# Patient Record
Sex: FEMALE | Race: WHITE | Employment: UNEMPLOYED | URBAN - METROPOLITAN AREA
[De-identification: names, ages, dates, MRNs, and addresses within clinical notes are randomized per-mention and may not be internally consistent; named-entity substitution may affect disease eponyms.]

---

## 2017-05-08 ENCOUNTER — ED HISTORICAL/CONVERTED ENCOUNTER (OUTPATIENT)
Dept: OTHER | Age: 22
End: 2017-05-08

## 2017-05-16 ENCOUNTER — OFFICE VISIT (OUTPATIENT)
Dept: FAMILY MEDICINE CLINIC | Age: 22
End: 2017-05-16

## 2017-05-16 VITALS
BODY MASS INDEX: 24.95 KG/M2 | TEMPERATURE: 98.4 F | HEART RATE: 84 BPM | RESPIRATION RATE: 18 BRPM | SYSTOLIC BLOOD PRESSURE: 120 MMHG | HEIGHT: 62 IN | OXYGEN SATURATION: 100 % | WEIGHT: 135.6 LBS | DIASTOLIC BLOOD PRESSURE: 62 MMHG

## 2017-05-16 DIAGNOSIS — F51.4 NIGHT TERRORS, ADULT: ICD-10-CM

## 2017-05-16 DIAGNOSIS — J02.9 SORE THROAT: Primary | ICD-10-CM

## 2017-05-16 DIAGNOSIS — F51.01 PRIMARY INSOMNIA: ICD-10-CM

## 2017-05-16 LAB
S PYO AG THROAT QL: NEGATIVE
VALID INTERNAL CONTROL?: YES

## 2017-05-16 RX ORDER — CLONAZEPAM 0.5 MG/1
0.5 TABLET ORAL
Qty: 30 TAB | Refills: 0 | Status: SHIPPED | OUTPATIENT
Start: 2017-05-16 | End: 2017-10-28

## 2017-05-16 NOTE — PROGRESS NOTES
Milly Medrano is a 24 y.o. female   Chief Complaint   Patient presents with    Sore Throat    pt with a sore throat for past 3 days. Had strep throat 6 months ago and feels like that again. Pt does have seasonal allergies as well. Feels hot and cold but has not taken her temp. Took aleve an hour ago and did help. Pt also has not been sleeping well for the past month. States can fall asleep then tosses and turns all night. Pt also reports gets night terrors wakes up \"screaming blood murder. \"   checked. she is a 24y.o. year old female who presents for evalution. Reviewed PmHx, RxHx, FmHx, SocHx, AllgHx and updated and dated in the chart. Review of Systems - negative except as listed above in the HPI    Objective:     Vitals:    05/16/17 1421   BP: 120/62   Pulse: 84   Resp: 18   Temp: 98.4 °F (36.9 °C)   TempSrc: Oral   SpO2: 100%   Weight: 135 lb 9.6 oz (61.5 kg)   Height: 5' 2\" (1.575 m)       Current Outpatient Prescriptions   Medication Sig    clonazePAM (KLONOPIN) 0.5 mg tablet Take 1 Tab by mouth nightly as needed. Max Daily Amount: 0.5 mg.    NUVARING 0.12-0.015 mg/24 hr vaginal ring INSERT 1 RING VAGINALLY AS DIRECTED. REMOVE AFTER 3 WEEKS & WAIT 7 DAYS BEFORE INSERTING A NEW RING    methocarbamol (ROBAXIN) 750 mg tablet Take 1 Tab by mouth two (2) times daily as needed.  diclofenac EC (VOLTAREN) 75 mg EC tablet Take 1 Tab by mouth two (2) times a day.  buPROPion XL (WELLBUTRIN XL) 300 mg XL tablet Take 1 Tab by mouth every morning. Maintenance dose. No current facility-administered medications for this visit.         Physical Examination: General appearance - alert, well appearing, and in no distress  Mental status - alert, oriented to person, place, and time  Eyes - pupils equal and reactive, extraocular eye movements intact  Ears - bilateral TM's and external ear canals normal  Mouth - mucous membranes moist, mild cobblestoning c/w PND  Neck - supple, no significant adenopathy  Chest - clear to auscultation, no wheezes, rales or rhonchi, symmetric air entry  Heart - normal rate, regular rhythm, normal S1, S2, no murmurs, rubs, clicks or gallops      Assessment/ Plan:   Garry Montoya was seen today for sore throat. Diagnoses and all orders for this visit:    Sore throat  -     AMB POC RAPID STREP A  Supportive care discussed with pt   Night terrors, adult  -     clonazePAM (KLONOPIN) 0.5 mg tablet; Take 1 Tab by mouth nightly as needed. Max Daily Amount: 0.5 mg.    Primary insomnia  -     clonazePAM (KLONOPIN) 0.5 mg tablet; Take 1 Tab by mouth nightly as needed. Max Daily Amount: 0.5 mg. Follow-up Disposition:  Return in about 1 month (around 6/16/2017), or if symptoms worsen or fail to improve. I have discussed the diagnosis with the patient and the intended plan as seen in the above orders. The patient has received an after-visit summary and questions were answered concerning future plans. Pt conveyed understanding of plan.     Medication Side Effects and Warnings were discussed with patient      Ioana Moseley,

## 2017-05-16 NOTE — MR AVS SNAPSHOT
Visit Information Date & Time Provider Department Dept. Phone Encounter #  
 5/16/2017  2:15 PM Hal CisseAleta [phone] [id number] Follow-up Instructions Return in about 1 month (around 6/16/2017), or if symptoms worsen or fail to improve. Upcoming Health Maintenance Date Due  
 HPV AGE 9Y-34Y (1 of 3 - Female 3 Dose Series) 12/11/2006 DTaP/Tdap/Td series (1 - Tdap) 12/11/2016 PAP AKA CERVICAL CYTOLOGY 12/11/2016 INFLUENZA AGE 9 TO ADULT 8/1/2017 Allergies as of 5/16/2017  Review Complete On: 5/16/2017 By: Hal Cisse, DO Severity Noted Reaction Type Reactions Lamictal [Lamotrigine]  09/06/2016    Rash Current Immunizations  Never Reviewed No immunizations on file. Not reviewed this visit You Were Diagnosed With   
  
 Codes Comments Sore throat    -  Primary ICD-10-CM: J02.9 ICD-9-CM: 467 Night terrors, adult     ICD-10-CM: F51.4 ICD-9-CM: 307.46 Primary insomnia     ICD-10-CM: F51.01 
ICD-9-CM: 307.42 Vitals BP Pulse Temp Resp Height(growth percentile) Weight(growth percentile) 120/62 84 98.4 °F (36.9 °C) (Oral) 18 5' 2\" (1.575 m) 135 lb 9.6 oz (61.5 kg) SpO2 BMI OB Status Smoking Status 100% 24.8 kg/m2 Chemically Induced Former Smoker Vitals History BMI and BSA Data Body Mass Index Body Surface Area  
 24.8 kg/m 2 1.64 m 2 Preferred Pharmacy Pharmacy Name Phone [id number]- [place], [place] - [address] [place] [address] AT [address] [place] [address] [phone] Your Updated Medication List  
  
   
This list is accurate as of: 5/16/17  2:46 PM.  Always use your most recent med list.  
  
  
  
  
 buPROPion  mg XL tablet Commonly known as:  Alma Delia Tucker Take 1 Tab by mouth every morning. Maintenance dose. clonazePAM 0.5 mg tablet Commonly known as:  Simona Jose Take 1 Tab by mouth nightly as needed.  Max Daily Amount: 0.5 mg.  
 diclofenac EC 75 mg EC tablet Commonly known as:  VOLTAREN Take 1 Tab by mouth two (2) times a day. methocarbamol 750 mg tablet Commonly known as:  ROBAXIN Take 1 Tab by mouth two (2) times daily as needed. NUVARING 0.12-0.015 mg/24 hr vaginal ring Generic drug:  ethinyl estradiol-etonogestrel INSERT 1 RING VAGINALLY AS DIRECTED. REMOVE AFTER 3 WEEKS & WAIT 7 DAYS BEFORE INSERTING A NEW RING Prescriptions Printed Refills  
 clonazePAM (KLONOPIN) 0.5 mg tablet 0 Sig: Take 1 Tab by mouth nightly as needed. Max Daily Amount: 0.5 mg.  
 Class: Print Route: Oral  
  
We Performed the Following AMB POC RAPID STREP A [12727 CPT(R)] Follow-up Instructions Return in about 1 month (around 6/16/2017), or if symptoms worsen or fail to improve. To-Do List   
 05/17/2017 10:30 AM  
  Appointment with [facility] MRI 1 at [facility] ([phone]) 1. Please bring a list or a bag of your current medications to your appointment 2. Please be sure to remove ALL hair clips, pins, extensions, etc., prior to arriving for your MRI procedure. 3. Bring any non [facility] films or CDs pertaining to the area being imaged with you on the day of appointment. 4. A written order with a valid diagnosis and Physicians  signature is required for all scheduled tests. 5. Check in at registration 30min before your appointment time unless you were instructed to do otherwise. Patient Instructions Insomnia: Care Instructions Your Care Instructions Insomnia is the inability to sleep well. It is a common problem for most people at some time. Insomnia may make it hard for you to get to sleep, stay asleep, or sleep as long as you need to. This can make you tired and grouchy during the day. It can also make you forgetful, less effective at work, and unhappy. Insomnia can be caused by conditions such as depression or anxiety.  Pain can also affect your ability to sleep. When these problems are solved, the insomnia usually clears up. But sometimes bad sleep habits can cause insomnia. If insomnia is affecting your work or your enjoyment of life, you can take steps to improve your sleep. Follow-up care is a key part of your treatment and safety. Be sure to make and go to all appointments, and call your doctor if you are having problems. It's also a good idea to know your test results and keep a list of the medicines you take. How can you care for yourself at home? What to avoid · Do not have drinks with caffeine, such as coffee or black tea, for 8 hours before bed. · Do not smoke or use other types of tobacco near bedtime. Nicotine is a stimulant and can keep you awake. · Avoid drinking alcohol late in the evening, because it can cause you to wake in the middle of the night. · Do not eat a big meal close to bedtime. If you are hungry, eat a light snack. · Do not drink a lot of water close to bedtime, because the need to urinate may wake you up during the night. · Do not read or watch TV in bed. Use the bed only for sleeping and sexual activity. What to try · Go to bed at the same time every night, and wake up at the same time every morning. Do not take naps during the day. · Keep your bedroom quiet, dark, and cool. · Sleep on a comfortable pillow and mattress. · If watching the clock makes you anxious, turn it facing away from you so you cannot see the time. · If you worry when you lie down, start a worry book. Well before bedtime, write down your worries, and then set the book and your concerns aside. · Try meditation or other relaxation techniques before you go to bed. · If you cannot fall asleep, get up and go to another room until you feel sleepy. Do something relaxing. Repeat your bedtime routine before you go to bed again. · Make your house quiet and calm about an hour before bedtime.  Turn down the lights, turn off the TV, log off the computer, and turn down the volume on music. This can help you relax after a busy day. When should you call for help? Watch closely for changes in your health, and be sure to contact your doctor if: 
· Your efforts to improve your sleep do not work. · Your insomnia gets worse. · You have been feeling down, depressed, or hopeless or have lost interest in things that you usually enjoy. Where can you learn more? Go to http://mirella-naga.info/. Enter P513 in the search box to learn more about \"Insomnia: Care Instructions. \" Current as of: July 26, 2016 Content Version: 11.2 © 9298-7113 [employer]. Care instructions adapted under license by [employer] (which disclaims liability or warranty for this information). If you have questions about a medical condition or this instruction, always ask your healthcare professional. [place] [address] any warranty or liability for your use of this information. Introducing [facility]! Mile Burt introduces [employer] patient portal. Now you can access parts of your medical record, email your doctor's office, and request medication refills online. 1. In your internet browser, go to [email or web address] 2. Click on the First Time User? Click Here link in the Sign In box. You will see the New Member Sign Up page. 3. Enter your [employer] Access Code exactly as it appears below. You will not need to use this code after youve completed the sign-up process. If you do not sign up before the expiration date, you must request a new code. · [employer] Access Code: [email or web address] Expires: 8/14/2017  2:45 PM 
 
4. Enter the last four digits of your Social Security Number (xxxx) and Date of Birth (mm/dd/yyyy) as indicated and click Submit. You will be taken to the next sign-up page. 5. Create a Experts 911 ID. This will be your Experts 911 login ID and cannot be changed, so think of one that is secure and easy to remember. 6. Create a Experts 911 password. You can change your password at any time. 7. Enter your Password Reset Question and Answer. This can be used at a later time if you forget your password. 8. Enter your e-mail address. You will receive e-mail notification when new information is available in [address] 9. Click Sign Up. You can now view and download portions of your medical record. 10. Click the Download Summary menu link to download a portable copy of your medical information. If you have questions, please visit the Frequently Asked Questions section of the Experts 911 website. Remember, [employer] 911 is NOT to be used for urgent needs. For medical emergencies, dial 911. Now available from your iPhone and Android! Please provide this summary of care documentation to your next provider. Your primary care clinician is listed as Mildred Benítez. If you have any questions after today's visit, please call [phone].

## 2017-05-16 NOTE — PATIENT INSTRUCTIONS

## 2017-10-28 ENCOUNTER — HOSPITAL ENCOUNTER (EMERGENCY)
Age: 22
Discharge: HOME | End: 2017-10-28
Attending: EMERGENCY MEDICINE | Admitting: EMERGENCY MEDICINE
Payer: SELF-PAY

## 2017-10-28 ENCOUNTER — APPOINTMENT (OUTPATIENT)
Dept: GENERAL RADIOLOGY | Age: 22
End: 2017-10-28
Attending: EMERGENCY MEDICINE
Payer: SELF-PAY

## 2017-10-28 VITALS
TEMPERATURE: 98.1 F | OXYGEN SATURATION: 97 % | HEIGHT: 62 IN | BODY MASS INDEX: 26.57 KG/M2 | DIASTOLIC BLOOD PRESSURE: 80 MMHG | SYSTOLIC BLOOD PRESSURE: 128 MMHG | WEIGHT: 144.4 LBS | HEART RATE: 90 BPM | RESPIRATION RATE: 16 BRPM

## 2017-10-28 DIAGNOSIS — J06.9 ACUTE UPPER RESPIRATORY INFECTION: Primary | ICD-10-CM

## 2017-10-28 LAB — HCG UR QL: NEGATIVE

## 2017-10-28 PROCEDURE — 99283 EMERGENCY DEPT VISIT LOW MDM: CPT

## 2017-10-28 PROCEDURE — 81025 URINE PREGNANCY TEST: CPT

## 2017-10-28 PROCEDURE — 71020 XR CHEST PA LAT: CPT

## 2017-10-28 RX ORDER — PROMETHAZINE HYDROCHLORIDE AND CODEINE PHOSPHATE 6.25; 1 MG/5ML; MG/5ML
5 SOLUTION ORAL
Qty: 120 ML | Refills: 0 | Status: SHIPPED | OUTPATIENT
Start: 2017-10-28 | End: 2019-08-26

## 2017-10-28 RX ORDER — PREDNISONE 20 MG/1
20 TABLET ORAL DAILY
Qty: 5 TAB | Refills: 0 | Status: SHIPPED | OUTPATIENT
Start: 2017-10-28 | End: 2017-11-02

## 2017-10-28 NOTE — ED TRIAGE NOTES
Pt ambulates to treatment area she states that 4 days ago she began with a cough that has gotten much worse and this morning she coughed so much she vomited after the cough. She notes that she has a dull ache in her chest since the cough.  Pt has some sinus pressure as well she has not taken anything for her symptoms

## 2017-10-28 NOTE — ED PROVIDER NOTES
HPI Comments: The patient has a 4 day history of cough that is occasionally productive. She also has had nasal congestion. She is unsure if she has had fever, but the cough became worse this morning and caused her to have one episode of vomiting. She also has a \"dull ache\" in the center of her chest which is pleuritic. She denies having shortness of breath, wheezing or other complaints. Patient is a 24 y.o. female presenting with cough. Cough   Associated symptoms include nausea and vomiting. Pertinent negatives include no chest pain, no headaches, no shortness of breath, no wheezing and no confusion. Past Medical History:   Diagnosis Date    Anxiety     Asthma     Bipolar 1 disorder ([address]     Pneumonia     Strep throat        Past Surgical History:   Procedure Laterality Date    HX HEENT      strabismus correction          History reviewed. No pertinent family history. Social History     Social History    Marital status:      Spouse name: N/A    Number of children: N/A    Years of education: N/A     Occupational History    Not on file. Social History Main Topics    Smoking status: Former Smoker     Packs/day: 0.50    Smokeless tobacco: Never Used    Alcohol use Yes      Comment: occasional    Drug use: Yes     Special: Other    Sexual activity: Yes     Partners: Male     Other Topics Concern    Not on file     Social History Narrative         ALLERGIES: Lamictal [lamotrigine]    Review of Systems   Constitutional: Negative for fever. HENT: Positive for congestion. Eyes: Negative for visual disturbance. Respiratory: Positive for cough and chest tightness. Negative for shortness of breath and wheezing. Cardiovascular: Negative for chest pain and leg swelling. Gastrointestinal: Positive for nausea and vomiting. Negative for abdominal pain and diarrhea. Genitourinary: Positive for dysuria. Musculoskeletal: Negative. Negative for back pain and neck stiffness. Skin: Negative for rash. Neurological: Negative. Negative for syncope and headaches. Psychiatric/Behavioral: Negative for confusion. Vitals:    10/28/17 1015   BP: 128/80   Pulse: 90   Resp: 16   Temp: 98.1 °F (36.7 °C)   SpO2: 97%   Weight: 65.5 kg (144 lb 6.4 oz)   Height: 5' 2\" (1.575 m)            Physical Exam   Constitutional: She appears well-developed and well-nourished. No distress. HENT:   Head: Normocephalic. Mouth/Throat: Oropharynx is clear and moist. No oropharyngeal exudate. Eyes: Pupils are equal, round, and reactive to light. Neck: Normal range of motion. Cardiovascular: Normal rate and regular rhythm. No murmur heard. Pulmonary/Chest: Effort normal and breath sounds normal. No respiratory distress. She has no wheezes. Abdominal: Soft. There is no tenderness. Musculoskeletal: Normal range of motion. She exhibits no edema. Neurological: She is alert. She has normal strength. Skin: Skin is warm and dry. Psychiatric: She has a normal mood and affect. Her behavior is normal.   Nursing note and vitals reviewed.        [facility]  ED Course       Procedures

## 2017-10-28 NOTE — DISCHARGE INSTRUCTIONS
Upper Respiratory Infection (Cold): Care Instructions  Your Care Instructions    An upper respiratory infection, or URI, is an infection of the nose, sinuses, or throat. URIs are spread by coughs, sneezes, and direct contact. The common cold is the most frequent kind of URI. The flu and sinus infections are other kinds of URIs. Almost all URIs are caused by viruses. Antibiotics won't cure them. But you can treat most infections with home care. This may include drinking lots of fluids and taking over-the-counter pain medicine. You will probably feel better in 4 to 10 days. The doctor has checked you carefully, but problems can develop later. If you notice any problems or new symptoms, get medical treatment right away. Follow-up care is a key part of your treatment and safety. Be sure to make and go to all appointments, and call your doctor if you are having problems. It's also a good idea to know your test results and keep a list of the medicines you take. How can you care for yourself at home? · To prevent dehydration, drink plenty of fluids, enough so that your urine is light yellow or clear like water. Choose water and other caffeine-free clear liquids until you feel better. If you have kidney, heart, or liver disease and have to limit fluids, talk with your doctor before you increase the amount of fluids you drink. · Take an over-the-counter pain medicine, such as acetaminophen (Tylenol), ibuprofen (Advil, Motrin), or naproxen (Aleve). Read and follow all instructions on the label. · Before you use cough and cold medicines, check the label. These medicines may not be safe for young children or for people with certain health problems. · Be careful when taking over-the-counter cold or flu medicines and Tylenol at the same time. Many of these medicines have acetaminophen, which is Tylenol. Read the labels to make sure that you are not taking more than the recommended dose.  Too much acetaminophen (Tylenol) can be harmful. · Get plenty of rest.  · Do not smoke or allow others to smoke around you. If you need help quitting, talk to your doctor about stop-smoking programs and medicines. These can increase your chances of quitting for good. When should you call for help? Call 911 anytime you think you may need emergency care. For example, call if:  ? · You have severe trouble breathing. ?Call your doctor now or seek immediate medical care if:  ? · You seem to be getting much sicker. ? · You have new or worse trouble breathing. ? · You have a new or higher fever. ? · You have a new rash. ? Watch closely for changes in your health, and be sure to contact your doctor if:  ? · You have a new symptom, such as a sore throat, an earache, or sinus pain. ? · You cough more deeply or more often, especially if you notice more mucus or a change in the color of your mucus. ? · You do not get better as expected. Where can you learn more? Go to http://mirella-naga.info/. Enter H862 in the search box to learn more about \"Upper Respiratory Infection (Cold): Care Instructions. \"  Current as of: May 12, 2017  Content Version: 11.4  © 3182-6128 [employer]. Care instructions adapted under license by [employer] (which disclaims liability or warranty for this information). If you have questions about a medical condition or this instruction, always ask your healthcare professional. [place] [address] any warranty or liability for your use of this information.

## 2017-10-28 NOTE — ED NOTES
Pt given discharge instructions by Dr Wilda Lipcsomb she verbalizes an understanding pt stable at time of discharge ambulates to funmi

## 2019-08-26 ENCOUNTER — OFFICE VISIT (OUTPATIENT)
Dept: FAMILY MEDICINE CLINIC | Age: 24
End: 2019-08-26

## 2019-08-26 VITALS
BODY MASS INDEX: 26.87 KG/M2 | RESPIRATION RATE: 18 BRPM | WEIGHT: 146 LBS | TEMPERATURE: 98.6 F | HEIGHT: 62 IN | OXYGEN SATURATION: 98 % | HEART RATE: 103 BPM | DIASTOLIC BLOOD PRESSURE: 79 MMHG | SYSTOLIC BLOOD PRESSURE: 125 MMHG

## 2019-08-26 DIAGNOSIS — F41.0 ANXIETY ATTACK: Primary | ICD-10-CM

## 2019-08-26 DIAGNOSIS — F51.01 PRIMARY INSOMNIA: ICD-10-CM

## 2019-08-26 DIAGNOSIS — F41.1 GAD (GENERALIZED ANXIETY DISORDER): ICD-10-CM

## 2019-08-26 RX ORDER — VENLAFAXINE HYDROCHLORIDE 75 MG/1
75 CAPSULE, EXTENDED RELEASE ORAL DAILY
Qty: 30 CAP | Refills: 1 | Status: SHIPPED | OUTPATIENT
Start: 2019-08-26 | End: 2020-05-19 | Stop reason: SINTOL

## 2019-08-26 RX ORDER — CLONAZEPAM 1 MG/1
1 TABLET ORAL
Qty: 45 TAB | Refills: 2 | Status: SHIPPED | OUTPATIENT
Start: 2019-08-26 | End: 2020-05-19 | Stop reason: SDUPTHER

## 2019-08-26 RX ORDER — VENLAFAXINE HYDROCHLORIDE 37.5 MG/1
37.5 CAPSULE, EXTENDED RELEASE ORAL DAILY
Qty: 7 CAP | Refills: 0 | Status: SHIPPED | OUTPATIENT
Start: 2019-08-26 | End: 2020-05-19 | Stop reason: SINTOL

## 2019-08-26 NOTE — PROGRESS NOTES
Chief Complaint   Patient presents with    Anxiety    Medication Evaluation     Patient in office today for anxiety that has worsened in the past 3 months,  Pt states she has recently had break up in life  Pt states anxiety attacks is affecting sleep and work, have not slept in 2 days. Pt notes 3 panic attacks a wk, that can last up to 5-20mins. Pt states in the past have tried and failed vistaril. Having severe anxiety to the point that she is having to miss work. Pt has previously been diagnosed with bipolar disorder. Has previously been manic but more often has the severe depression. Doesn't feel manic right now. Just feels like she cannot turn off her brain and severe anxiety sx are what is keeping her up. Was previously on wellbutrin to help with smoking cessation. Stopped the medication when she stopped smoking and things seemed to feel better. Seeing a therapist.   Long wait for an appt with psych. Has an appt at [facility] but needs something to tier her. Has tried and failed buspar. Denies any other concerns at this time. Chief Complaint   Patient presents with    Anxiety    Medication Evaluation     she is a 21y.o. year old female who presents for evalution. Reviewed PmHx, RxHx, FmHx, SocHx, AllgHx and updated and dated in the chart. Review of Systems - negative except as listed above in the HPI    Objective:     Vitals:    08/26/19 1040   BP: 125/79   Pulse: (!) 103   Resp: 18   Temp: 98.6 °F (37 °C)   TempSrc: Oral   SpO2: 98%   Weight: 146 lb (66.2 kg)   Height: 5' 2\" (1.575 m)     Physical Examination: General appearance - alert, well appearing, and in no distress  Mental status - depressed mood, anxious  Chest - clear to auscultation, no wheezes, rales or rhonchi, symmetric air entry  Heart - normal rate, regular rhythm, normal S1, S2, no murmurs    Assessment/ Plan:   Diagnoses and all orders for this visit:    1.  Anxiety attack  -     clonazePAM (KLONOPIN) 1 mg tablet; Take 1 Tab by mouth two (2) times daily as needed (anxiety). Max Daily Amount: 2 mg. 45 tabs should last 30 days. Start klonopin BID PRN for acute anxiety sx. Reinforced SEs/ADRs, only taking as prescribed, do not mix with etoh, and taking for acute sx of anxiety only. 2. ASHANTI (generalized anxiety disorder)  -     venlafaxine-SR (EFFEXOR-XR) 37.5 mg capsule; Take 1 Cap by mouth daily. Starting dose days  1-7.  -     venlafaxine-SR (EFFEXOR-XR) 75 mg capsule; Take 1 Cap by mouth daily. Maintenance dose. Start effexor daily. Reviewed SEs? ADRs of medication. Enc pt to follow up if sx persist or worsen or if medication SEs intolerable to discuss alt medication options. Keep appt with psych. Continue working with therapist. We discussed that sometimes SNRIs can exacerbate bipolar sx and enc to stop medication should that happen. 3. Primary insomnia  -     clonazePAM (KLONOPIN) 1 mg tablet; Take 1 Tab by mouth two (2) times daily as needed (anxiety). Max Daily Amount: 2 mg. 45 tabs should last 30 days. Klonopin before bedtime as needed. Follow-up and Dispositions    · Return if symptoms worsen or fail to improve. I have discussed the diagnosis with the patient and the intended plan as seen in the above orders. The patient has received an after-visit summary and questions were answered concerning future plans.      Medication Side Effects and Warnings were discussed with patient: yes  Patient Labs were reviewed and or requested: no  Patient Past Records were reviewed and or requested  yes  Patient / Caregiver Understanding of treatment plan was verbalized during office visit MESHA Perez    Patient Instructions   [facility] 12 [address] [facility] [address] [place] [address], [facility]  Phone [phone]    For therapy  Olivia Martinez LCSW  [facility]  [address] [place] [address]   Jeaneth Ramsay 57  [phone]     [facility] [address] [place] [address], [address] [facility] [address]  [phone]    [address] [place] [address]. [address] [place], [place] [address]Jazzmine La, [address]  Phone: [phone]  Fax: [phone]    4010 [facility] [address]  Phone: [phone]  Fax: [phone] [address] [facility], [address]  Phone: [phone]  Fax: [phone]      Venlafaxine (By mouth)   Venlafaxine (ysh-te-LIG-een)  Treats depression, generalized anxiety disorder, panic disorder, and social anxiety disorder. Brand Name(s): Effexor XR   There may be other brand names for this medicine. When This Medicine Should Not Be Used: This medicine is not right for everyone. Do not use it if you had an allergic reaction to venlafaxine or desvenlafaxine succinate. How to Use This Medicine:   Long Acting Capsule, Tablet, Long Acting Tablet  · Take your medicine as directed. Your dose may need to be changed several times to find what works best for you. · It is best to take the extended-release capsule at the same time each day (either in the morning or evening). · It is best to take this medicine with food or milk. · Swallow the extended-release capsule whole. Do not crush, break, or chew it. Do not place the capsule in a liquid. · If you cannot swallow the extended-release capsule, you may open it and pour the medicine into a small amount of soft food such as pudding, yogurt, or applesauce. Stir this mixture well and swallow it without chewing. · This medicine should come with a Medication Guide. Ask your pharmacist for a copy if you do not have one. · Missed dose: Take a dose as soon as you remember. If it is almost time for your next dose, wait until then and take a regular dose. Do not take extra medicine to make up for a missed dose.   · Store the medicine in a closed container at room temperature, away from heat, moisture, and direct light. Drugs and Foods to Avoid:   Ask your doctor or pharmacist before using any other medicine, including over-the-counter medicines, vitamins, and herbal products. · Do not use this medicine if you have used an MAO inhibitor within the past 14 days. Do not take an MAO inhibitor for at least 7 days after you stop this medicine. · Some medicines can affect how venlafaxine works. Tell your doctor if you are using any of the following:   ¨ Buspirone, cimetidine, fentanyl, ketoconazole, lithium, metoprolol, mirtazapine, Radhika's wort, tramadol, or tryptophan supplements  ¨ Amphetamines  ¨ Blood thinner (including warfarin)  ¨ Diuretic (water pill)  ¨ Medicine for migraine headaches  ¨ Medicine to lose weight (including phentermine)  ¨ NSAID pain or arthritis medicine (including aspirin, celecoxib, diclofenac, ibuprofen, naproxen)  ¨ Tricyclic antidepressant  · Do not drink alcohol while you are using this medicine. · Tell your doctor if you use anything else that makes you sleepy. Some examples are allergy medicine, narcotic pain medicine, and alcohol. Warnings While Using This Medicine:   · Tell your doctor if you are pregnant or breastfeeding, or if you have kidney disease, liver disease, glaucoma, heart disease, high blood pressure, or thyroid problems. Tell your doctor if you have a history of jocelyn, seizures, heart attack, or stroke. · This medicine can increase thoughts of suicide. Tell your doctor right away if you start to feel depressed and have thoughts about hurting yourself. · This medicine may cause the following problems:   ¨ Serotonin syndrome (when used with certain medicines)  ¨ Increased cholesterol levels  ¨ Increased blood pressure  ¨ Increased risk of bleeding problems  ¨ Low sodium levels  ¨ Interstitial lung disease and eosinophilic pneumonia  · This medicine may make you dizzy or drowsy.  Do not drive or do anything that could be dangerous until you know how this medicine affects you.  · Do not stop using this medicine suddenly. Your doctor will need to slowly decrease your dose before you stop it completely. · Tell any doctor or dentist who treats you that you are using this medicine. This medicine may affect certain medical test results. · Your doctor will do lab tests at regular visits to check on the effects of this medicine. Keep all appointments. · Keep all medicine out of the reach of children. Never share your medicine with anyone. Possible Side Effects While Using This Medicine:   Call your doctor right away if you notice any of these side effects:  · Allergic reaction: Itching or hives, swelling in your face or hands, swelling or tingling in your mouth or throat, chest tightness, trouble breathing  · Anxiety, restlessness, fever, sweating, muscle spasms, nausea, vomiting, diarrhea, seeing or hearing things that are not there  · Blistering, peeling, red skin rash  · Chest pain, cough, trouble breathing  · Confusion, weakness, and muscle twitching  · Eye pain, vision changes, seeing halos around lights  · Fast or pounding heartbeat  · Feeling more excited or energetic than usual  · Headache, trouble concentrating, memory problems, unsteadiness  · Seizures  · Unusual behavior, thoughts of hurting yourself or others, trouble sleeping, nervousness, unusual dreams  · Unusual bleeding or bruising  If you notice these less serious side effects, talk with your doctor:   · Dry mouth  · Mild nausea, constipation, vomiting, loss of appetite, weight loss  · Sexual problems  · Sleepiness or unusual drowsiness, dizziness  If you notice other side effects that you think are caused by this medicine, tell your doctor. Call your doctor for medical advice about side effects. You may report side effects to FDA at 3-322-DGJ-8539  © 2017 [facility] Information is for End User's use only and may not be sold, redistributed or otherwise used for commercial purposes.   The above information is an  only. It is not intended as medical advice for individual conditions or treatments. Talk to your doctor, nurse or pharmacist before following any medical regimen to see if it is safe and effective for you.

## 2019-08-26 NOTE — PATIENT INSTRUCTIONS
[facility]  [facility] [address]  Phone [phone]    For therapy  Olena Carrel, LCSW  [facility]  [address] [facility], [place] [address]  [phone]     [facility], [facility] [address], [address] [place], [address]  [phone]    25 [facility] [address] [place] [address] [place], [place]  Phone: [phone]  Fax: [phone]    [address]  Phone: [phone]  Fax: [phone] [address] [facility], [address]  Phone: [phone]  Fax: [phone]      Venlafaxine (By mouth)   Venlafaxine ([id number]-fx-UYI-een)  Treats depression, generalized anxiety disorder, panic disorder, and social anxiety disorder. Brand Name(s): Effexor XR   There may be other brand names for this medicine. When This Medicine Should Not Be Used: This medicine is not right for everyone. Do not use it if you had an allergic reaction to venlafaxine or desvenlafaxine succinate. How to Use This Medicine:   Long Acting Capsule, Tablet, Long Acting Tablet  · Take your medicine as directed. Your dose may need to be changed several times to find what works best for you. · It is best to take the extended-release capsule at the same time each day (either in the morning or evening). · It is best to take this medicine with food or milk. · Swallow the extended-release capsule whole. Do not crush, break, or chew it. Do not place the capsule in a liquid. · If you cannot swallow the extended-release capsule, you may open it and pour the medicine into a small amount of soft food such as pudding, yogurt, or applesauce. Stir this mixture well and swallow it without chewing. · This medicine should come with a Medication Guide. Ask your pharmacist for a copy if you do not have one.   · Missed dose: Take a dose as soon as you remember. If it is almost time for your next dose, wait until then and take a regular dose. Do not take extra medicine to make up for a missed dose. · Store the medicine in a closed container at room temperature, away from heat, moisture, and direct light. Drugs and Foods to Avoid:   Ask your doctor or pharmacist before using any other medicine, including over-the-counter medicines, vitamins, and herbal products. · Do not use this medicine if you have used an MAO inhibitor within the past 14 days. Do not take an MAO inhibitor for at least 7 days after you stop this medicine. · Some medicines can affect how venlafaxine works. Tell your doctor if you are using any of the following:   ¨ Buspirone, cimetidine, fentanyl, ketoconazole, lithium, metoprolol, mirtazapine, Radhika's wort, tramadol, or tryptophan supplements  ¨ Amphetamines  ¨ Blood thinner (including warfarin)  ¨ Diuretic (water pill)  ¨ Medicine for migraine headaches  ¨ Medicine to lose weight (including phentermine)  ¨ NSAID pain or arthritis medicine (including aspirin, celecoxib, diclofenac, ibuprofen, naproxen)  ¨ Tricyclic antidepressant  · Do not drink alcohol while you are using this medicine. · Tell your doctor if you use anything else that makes you sleepy. Some examples are allergy medicine, narcotic pain medicine, and alcohol. Warnings While Using This Medicine:   · Tell your doctor if you are pregnant or breastfeeding, or if you have kidney disease, liver disease, glaucoma, heart disease, high blood pressure, or thyroid problems. Tell your doctor if you have a history of jocelyn, seizures, heart attack, or stroke. · This medicine can increase thoughts of suicide. Tell your doctor right away if you start to feel depressed and have thoughts about hurting yourself.   · This medicine may cause the following problems:   ¨ Serotonin syndrome (when used with certain medicines)  ¨ Increased cholesterol levels  ¨ Increased blood pressure  ¨ Increased risk of bleeding problems  ¨ Low sodium levels  ¨ Interstitial lung disease and eosinophilic pneumonia  · This medicine may make you dizzy or drowsy. Do not drive or do anything that could be dangerous until you know how this medicine affects you. · Do not stop using this medicine suddenly. Your doctor will need to slowly decrease your dose before you stop it completely. · Tell any doctor or dentist who treats you that you are using this medicine. This medicine may affect certain medical test results. · Your doctor will do lab tests at regular visits to check on the effects of this medicine. Keep all appointments. · Keep all medicine out of the reach of children. Never share your medicine with anyone.   Possible Side Effects While Using This Medicine:   Call your doctor right away if you notice any of these side effects:  · Allergic reaction: Itching or hives, swelling in your face or hands, swelling or tingling in your mouth or throat, chest tightness, trouble breathing  · Anxiety, restlessness, fever, sweating, muscle spasms, nausea, vomiting, diarrhea, seeing or hearing things that are not there  · Blistering, peeling, red skin rash  · Chest pain, cough, trouble breathing  · Confusion, weakness, and muscle twitching  · Eye pain, vision changes, seeing halos around lights  · Fast or pounding heartbeat  · Feeling more excited or energetic than usual  · Headache, trouble concentrating, memory problems, unsteadiness  · Seizures  · Unusual behavior, thoughts of hurting yourself or others, trouble sleeping, nervousness, unusual dreams  · Unusual bleeding or bruising  If you notice these less serious side effects, talk with your doctor:   · Dry mouth  · Mild nausea, constipation, vomiting, loss of appetite, weight loss  · Sexual problems  · Sleepiness or unusual drowsiness, dizziness  If you notice other side effects that you think are caused by this medicine, tell your doctor. Call your doctor for medical advice about side effects. You may report side effects to FDA at 1-436-FDA-5901  © 2017 [address] Information is for End User's use only and may not be sold, redistributed or otherwise used for commercial purposes. The above information is an  only. It is not intended as medical advice for individual conditions or treatments. Talk to your doctor, nurse or pharmacist before following any medical regimen to see if it is safe and effective for you.

## 2019-08-26 NOTE — PROGRESS NOTES
Chief Complaint   Patient presents with    Anxiety    Medication Evaluation     Patient in office today for anxiety that has worsened in the past 3 months,  Pt states she has recently had break up in life  Pt states anxiety attacks is affecting sleep and work,have not slept in 2 days. Pt notes 3 panic attacks a wk, that can last u}p to 5-20mins. Pt states in the past have tried and failed and vistaril. 1. Have you been to the ER, urgent care clinic since your last visit? Hospitalized since your last visit? No    2. Have you seen or consulted any other health care providers outside of the [address] [place] [address] since your last visit? Include any pap smears or colon screening.  No

## 2019-11-21 ENCOUNTER — ED HISTORICAL/CONVERTED ENCOUNTER (OUTPATIENT)
Dept: OTHER | Age: 24
End: 2019-11-21

## 2020-03-08 ENCOUNTER — ED HISTORICAL/CONVERTED ENCOUNTER (OUTPATIENT)
Dept: OTHER | Age: 25
End: 2020-03-08

## 2020-04-06 ENCOUNTER — IP HISTORICAL/CONVERTED ENCOUNTER (OUTPATIENT)
Dept: OTHER | Age: 25
End: 2020-04-06

## 2020-04-09 ENCOUNTER — DOCUMENTATION ONLY (OUTPATIENT)
Dept: FAMILY MEDICINE CLINIC | Age: 25
End: 2020-04-09

## 2020-04-09 PROBLEM — Z91.51 HISTORY OF SUICIDE ATTEMPT: Status: ACTIVE | Noted: 2020-04-09

## 2020-05-19 ENCOUNTER — VIRTUAL VISIT (OUTPATIENT)
Dept: FAMILY MEDICINE CLINIC | Age: 25
End: 2020-05-19

## 2020-05-19 ENCOUNTER — TELEPHONE (OUTPATIENT)
Dept: FAMILY MEDICINE CLINIC | Age: 25
End: 2020-05-19

## 2020-05-19 DIAGNOSIS — F41.0 ANXIETY ATTACK: ICD-10-CM

## 2020-05-19 DIAGNOSIS — F51.01 PRIMARY INSOMNIA: ICD-10-CM

## 2020-05-19 DIAGNOSIS — B37.31 YEAST VAGINITIS: Primary | ICD-10-CM

## 2020-05-19 RX ORDER — FLUCONAZOLE 150 MG/1
150 TABLET ORAL DAILY
Qty: 2 TAB | Refills: 0 | Status: SHIPPED | OUTPATIENT
Start: 2020-05-19 | End: 2021-03-16

## 2020-05-19 RX ORDER — CLONAZEPAM 1 MG/1
1-2 TABLET ORAL
Qty: 60 TAB | Refills: 2 | Status: SHIPPED | OUTPATIENT
Start: 2020-05-19 | End: 2020-09-03

## 2020-05-19 NOTE — TELEPHONE ENCOUNTER
Pt calling and just had virtual appt but forgot to ask ? Was given Clonazepam and can she take this if she is trying to get pregnant? And can she get a rx for prenatal vitamins as well. She can be reached at [phone].

## 2020-05-19 NOTE — TELEPHONE ENCOUNTER
Pt would like to know if provider can send in an alt to pharmacy then since klonopin is not recommended.

## 2020-05-19 NOTE — PROGRESS NOTES
Christina Fernandez is a 25 y.o. female who was seen by synchronous (real-time) audio-video technology on 5/19/2020. Consent: Christina Fernandez, who was seen by synchronous (real-time) audio-video technology, and/or her healthcare decision maker, is aware that this patient-initiated, Telehealth encounter on 5/19/2020 is a billable service, with coverage as determined by her insurance carrier. She is aware that she may receive a bill and has provided verbal consent to proceed: Yes. Assessment & Plan:   Diagnoses and all orders for this visit:    1. Yeast vaginitis  -     fluconazole (DIFLUCAN) 150 mg tablet; Take 1 Tab by mouth daily. Repeat in 3 days if needed. Complete as directed. Follow up if sx persist or worsen. 2. Primary insomnia / 3. Anxiety attack  -     clonazePAM (KlonoPIN) 1 mg tablet; Take 1-2 Tabs by mouth nightly as needed for Sleep (night terrors). Max Daily Amount: 2 mg. 60 tabs should last 30 days. Refilled rx. Reinforced SEs/ADRs, only taking as prescribed, do not mix with etoh, and taking for insomnia only. Discouraged taking more than 2 tabs a as a single dose. Also recommended pt est care with Shan Lomax our behavioral specialist for further evaluation due to increased frequency of night terrors and worsening anxiety. I spent at least 23 minutes on this visit with this established patient. [phone]  Subjective:   Christina Fernandez is a 25 y.o. female who was seen for Medication Refill    Pt has been doing pretty good. Lost her insurance temporarily. States that her night terrors have been worsening due to being off of the klonopin. Was having to take 2.5-3 pills to be able to go to and stay asleep. Did not tolerate the effexor due to SEs. Hopeful that things will improve when pandemic improves. Happen more often when she is more stressed. Just bought a new house and is going to be moving this weekend. In a new relationship. Excited to move into a new house. Pt thinks she has a yeast infection. Reports vaginal discharge that is white, itchy, denies any odor to the discharge. Denies any pain with urination. Denies any recent abx. Prior to Admission medications    Medication Sig Start Date End Date Taking? Authorizing Provider   clonazePAM (KLONOPIN) 1 mg tablet Take 1 Tab by mouth two (2) times daily as needed (anxiety). Max Daily Amount: 2 mg. 45 tabs should last 30 days. 8/26/19  Yes Iva Galeana NP   venlafaxine-SR [facility] 37.5 mg capsule Take 1 Cap by mouth daily. Starting dose days  1-7. Patient not taking: Reported on 5/19/2020 8/26/19   Iva Galeana NP   venlafaxine-SR [facility] 75 mg capsule Take 1 Cap by mouth daily. Maintenance dose. Patient not taking: Reported on 5/19/2020 8/26/19   Iva Galeana NP     Allergies   Allergen Reactions    Lamictal [Lamotrigine] Rash       Patient Active Problem List    Diagnosis Date Noted    History of suicide attempt 04/09/2020    Bipolar disorder, unspecified ([facility] [address] 09/06/2016    IUD (intrauterine device) in place 09/06/2016    Family history not obtainable due to adoption 09/06/2016     Current Outpatient Medications   Medication Sig Dispense Refill    clonazePAM (KlonoPIN) 1 mg tablet Take 1-2 Tabs by mouth nightly as needed for Sleep (night terrors). Max Daily Amount: 2 mg. 60 tabs should last 30 days. 60 Tab 2    fluconazole (DIFLUCAN) 150 mg tablet Take 1 Tab by mouth daily. Repeat in 3 days if needed. 2 Tab 0     Allergies   Allergen Reactions    Lamictal [Lamotrigine] Rash       ROS      Objective: There were no vitals taken for this visit.    General: alert, cooperative, no distress   Mental  status: normal mood, behavior, speech, dress, motor activity, and thought processes, able to follow commands   HENT: NCAT   Neck: no visualized mass   Resp: no respiratory distress   Neuro: no gross deficits   Skin: no discoloration or lesions of concern on visible areas Psychiatric: normal affect, consistent with stated mood, no evidence of hallucinations     Additional exam findings: We discussed the expected course, resolution and complications of the diagnosis(es) in detail. Medication risks, benefits, costs, interactions, and alternatives were discussed as indicated. I advised her to contact the office if her condition worsens, changes or fails to improve as anticipated. She expressed understanding with the diagnosis(es) and plan. Syd Dobbins is a 25 y.o. female who was evaluated by a video visit encounter for concerns as above. Patient identification was verified prior to start of the visit. A caregiver was present when appropriate. Due to this being a TeleHealth encounter (During ZIOVV-57 public health emergency), evaluation of the following organ systems was limited: Vitals/Constitutional/EENT/Resp/CV/GI//MS/Neuro/Skin/Heme-Lymph-Imm. Pursuant to the emergency declaration under the [facility] waiver authority and the [employer] Act, this Virtual  Visit was conducted, with patient's (and/or legal guardian's) consent, to reduce the patient's risk of exposure to COVID-19 and provide necessary medical care. Services were provided through a video synchronous discussion virtually to substitute for in-person clinic visit. Patient and provider were located at their individual homes.       Ebony Craven NP

## 2020-05-21 NOTE — TELEPHONE ENCOUNTER
I'm not sure what else is indicated for night terrors that is safe to take during pregnancy. I recommend she follow up with her psychiatrist to discuss alt treatment options.

## 2020-05-28 ENCOUNTER — ED HISTORICAL/CONVERTED ENCOUNTER (OUTPATIENT)
Dept: OTHER | Age: 25
End: 2020-05-28

## 2020-06-08 ENCOUNTER — ED HISTORICAL/CONVERTED ENCOUNTER (OUTPATIENT)
Dept: OTHER | Age: 25
End: 2020-06-08

## 2020-06-13 ENCOUNTER — ED HISTORICAL/CONVERTED ENCOUNTER (OUTPATIENT)
Dept: OTHER | Age: 25
End: 2020-06-13

## 2020-06-23 ENCOUNTER — ED HISTORICAL/CONVERTED ENCOUNTER (OUTPATIENT)
Dept: OTHER | Age: 25
End: 2020-06-23

## 2020-08-21 ENCOUNTER — TELEPHONE (OUTPATIENT)
Dept: FAMILY MEDICINE CLINIC | Age: 25
End: 2020-08-21

## 2020-08-21 NOTE — TELEPHONE ENCOUNTER
Called patient x5. Last attempt made at 3:04 to check her in for her VV. No answer.  LVM for patient however at this point will need to r/s

## 2020-08-21 NOTE — TELEPHONE ENCOUNTER
Called and LVM for patient to call the office back. If she calls back please fit her in this afternoon with Dr. Joshua Roberts.

## 2020-08-21 NOTE — TELEPHONE ENCOUNTER
Patient returned call. Advised of virtual at [address]. up before was told how to do virtual using computer.

## 2020-08-21 NOTE — TELEPHONE ENCOUNTER
Pt calling and states she thinks she has a uti with symptoms of burning and urgency. Wants to have medication sent to pharmacy for this. Explained Mike Vergara is not in office and will habv to send to another provider. She can be reached at [phone].

## 2020-08-24 ENCOUNTER — ED HISTORICAL/CONVERTED ENCOUNTER (OUTPATIENT)
Dept: OTHER | Age: 25
End: 2020-08-24

## 2020-09-11 ENCOUNTER — ED HISTORICAL/CONVERTED ENCOUNTER (OUTPATIENT)
Dept: OTHER | Age: 25
End: 2020-09-11

## 2020-09-17 ENCOUNTER — TELEPHONE (OUTPATIENT)
Dept: FAMILY MEDICINE CLINIC | Age: 25
End: 2020-09-17

## 2020-09-17 ENCOUNTER — HOSPITAL ENCOUNTER (EMERGENCY)
Age: 25
Discharge: HOME | End: 2020-09-17
Attending: EMERGENCY MEDICINE
Payer: COMMERCIAL

## 2020-09-17 ENCOUNTER — APPOINTMENT (OUTPATIENT)
Dept: CT IMAGING | Age: 25
End: 2020-09-17
Attending: EMERGENCY MEDICINE
Payer: COMMERCIAL

## 2020-09-17 VITALS
SYSTOLIC BLOOD PRESSURE: 118 MMHG | OXYGEN SATURATION: 97 % | BODY MASS INDEX: 24.29 KG/M2 | HEIGHT: 62 IN | RESPIRATION RATE: 16 BRPM | WEIGHT: 132 LBS | TEMPERATURE: 97.9 F | HEART RATE: 98 BPM | DIASTOLIC BLOOD PRESSURE: 78 MMHG

## 2020-09-17 DIAGNOSIS — N30.90 CYSTITIS: Primary | ICD-10-CM

## 2020-09-17 LAB
APPEARANCE UR: CLEAR
BACTERIA URNS QL MICRO: NEGATIVE /HPF
BILIRUB UR QL: ABNORMAL
COLOR UR: YELLOW
EPITH CASTS URNS QL MICRO: ABNORMAL /LPF
GLUCOSE UR STRIP.AUTO-MCNC: NEGATIVE MG/DL
HCG UR QL: NEGATIVE
HGB UR QL STRIP: ABNORMAL
KETONES UR QL STRIP.AUTO: 50 MG/DL
LEUKOCYTE ESTERASE UR QL STRIP.AUTO: NEGATIVE
NITRITE UR QL STRIP.AUTO: NEGATIVE
PH UR STRIP: 5 [PH] (ref 5–8)
PROT UR STRIP-MCNC: ABNORMAL MG/DL
RBC #/AREA URNS HPF: ABNORMAL /HPF (ref 0–5)
SP GR UR REFRACTOMETRY: 1.02 (ref 1–1.03)
UROBILINOGEN UR QL STRIP.AUTO: 1 EU/DL (ref 0.2–1)
WBC URNS QL MICRO: ABNORMAL /HPF (ref 0–4)

## 2020-09-17 PROCEDURE — 81025 URINE PREGNANCY TEST: CPT

## 2020-09-17 PROCEDURE — 81001 URINALYSIS AUTO W/SCOPE: CPT

## 2020-09-17 PROCEDURE — 99283 EMERGENCY DEPT VISIT LOW MDM: CPT

## 2020-09-17 PROCEDURE — 74176 CT ABD & PELVIS W/O CONTRAST: CPT

## 2020-09-17 PROCEDURE — 74011250636 HC RX REV CODE- 250/636: Performed by: EMERGENCY MEDICINE

## 2020-09-17 RX ORDER — CARBAMAZEPINE 200 MG/1
TABLET ORAL
COMMUNITY
Start: 2020-06-30

## 2020-09-17 RX ORDER — SULFAMETHOXAZOLE AND TRIMETHOPRIM 800; 160 MG/1; MG/1
1 TABLET ORAL 2 TIMES DAILY
Qty: 6 TAB | Refills: 0 | Status: SHIPPED | OUTPATIENT
Start: 2020-09-17 | End: 2020-09-20

## 2020-09-17 RX ADMIN — SODIUM CHLORIDE 1000 ML: 9 INJECTION, SOLUTION INTRAVENOUS at 11:57

## 2020-09-17 RX ADMIN — SODIUM CHLORIDE 1000 ML: 9 INJECTION, SOLUTION INTRAVENOUS at 11:55

## 2020-09-17 NOTE — TELEPHONE ENCOUNTER
Patient didn't have an appetite for 4 days and after just now eating eggs and drinking milk, she is having pains in her right side that come and go and also a burning sensation. She would like to discuss what to do.  Patient'sphone: [phone]

## 2020-09-17 NOTE — ED PROVIDER NOTES
EMERGENCY DEPARTMENT HISTORY AND PHYSICAL EXAM      Date: 9/17/2020  Patient Name: Joe Rodriguez    History of Presenting Illness     Chief Complaint   Patient presents with    Decreased Appetite     X 4 days    Constipation     x 2 days, low fluid intake or food intake x 3 days.  Abdominal Pain     RLQ, yesterday, slow onset worse by 2300 last night, LMP: 3months ago nuva ring, stopped 1 month ago no period. History Provided By: Patient    HPI: Joe Rodriguez, 25 y.o. female presents with right sided abd pain,  Decreased po intake and uop for several  Days. She states some mild nausea but was able to drink several beers last night. No fever or vomiting. No urinary symptoms vaginal bleeding or discharge. She has history of seizure disorder but has not been taking her seizure medications. PCP: Terese Bernard NP    Current Outpatient Medications   Medication Sig Dispense Refill    trimethoprim-sulfamethoxazole (Bactrim DS) 160-800 mg per tablet Take 1 Tab by mouth two (2) times a day for 3 days. 6 Tab 0    carBAMazepine (TEGretol) 200 mg tablet TAKE 1 TABLET BY MOUTH TWICE A DAY      clonazePAM (KlonoPIN) 1 mg tablet TAKE 1-2 TABLETS BY MOUTH AS NEEDED FOR SLEEP/NIGHT TERRORS. NO MORE THAN 2 TABS DAILY/ LAST 30 DAYS 60 Tab 0    fluconazole (DIFLUCAN) 150 mg tablet Take 1 Tab by mouth daily. Repeat in 3 days if needed. 2 Tab 0       Past History     Past Medical History:  Past Medical History:   Diagnosis Date    Anxiety     Asthma     Bipolar 1 disorder ([facility] [address]     Pneumonia     Seizures ([facility] [address]     Strep throat        Past Surgical History:  Past Surgical History:   Procedure Laterality Date    HX HEENT      strabismus correction        Family History:  History reviewed. No pertinent family history. Social History:  Social History     Tobacco Use    Smoking status: Former Smoker     Packs/day: 0.50    Smokeless tobacco: Current User   Substance Use Topics    Alcohol use:  Yes Alcohol/week: 15.0 standard drinks     Types: 15 Cans of beer per week     Comment: 15/week    Drug use: Yes     Frequency: 1.0 times per week     Types: Cocaine, Methamphetamines, Other     Comment: crystal meth       Allergies: Allergies   Allergen Reactions    Lamictal [Lamotrigine] Rash    Peanut Hives    Peanuts [Peanut Oil] Hives         Review of Systems     Review of Systems   Constitutional: Negative for activity change, appetite change and fever. HENT: Negative for sore throat. Respiratory: Negative for cough and shortness of breath. Cardiovascular: Negative for chest pain. Gastrointestinal: Positive for abdominal pain. Negative for diarrhea and vomiting. Endocrine: Negative for polydipsia, polyphagia and polyuria. Genitourinary: Negative for dysuria and hematuria. Musculoskeletal: Negative for back pain. Skin: Negative for rash. Neurological: Negative for weakness, numbness and headaches. Psychiatric/Behavioral: Negative. Physical Exam     Physical Exam  Vitals signs and nursing note reviewed. Constitutional:       Appearance: She is well-developed. HENT:      Head: Normocephalic and atraumatic. Mouth/Throat:      Mouth: Mucous membranes are moist.      Pharynx: Oropharynx is clear. Eyes:      General: No scleral icterus. Extraocular Movements: Extraocular movements intact. Pupils: Pupils are equal, round, and reactive to light. Cardiovascular:      Rate and Rhythm: Normal rate and regular rhythm. Heart sounds: Normal heart sounds. Pulmonary:      Effort: Pulmonary effort is normal.      Breath sounds: Normal breath sounds. No wheezing, rhonchi or rales. Abdominal:      General: Bowel sounds are normal. There is no distension. Palpations: Abdomen is soft. Tenderness: There is abdominal tenderness in the right lower quadrant. There is no right CVA tenderness, left CVA tenderness, guarding or rebound.  Negative signs include Rovsing's sign.   Skin:     General: Skin is warm and dry. Findings: No rash. Neurological:      General: No focal deficit present. Mental Status: She is alert. Psychiatric:         Mood and Affect: Mood normal.         Behavior: Behavior normal.         Diagnostic Study Results     Labs -     No results found for this or any previous visit (from the past 12 hour(s)). Radiologic Studies -   [unfilled]  CT Results  (Last 48 hours)               09/17/20 1214  CT ABD PELV WO CONT Final result    Impression:  IMPRESSION: No CT evidence for appendicitis. No bowel obstruction. No ascites. No calcific focus either kidney or bladder to suggest the presence of stone. Narrative:  CT abdomen and pelvis without IV contrast       Axial images are reviewed along with reformatted sagittal/coronal images. No IV   contrast administered. Dose reduction: All CT scans at this facility are performed using dose reduction   optimization techniques as appropriate to a performed exam including the   following-   automated exposure control, adjustments of mA and/or Kv according to patient   size, or use of iterative reconstructive technique. .       The lungs are clear. Liver appears unremarkable on this nonenhanced study. Gallbladder nondistended. Pancreas, spleen, bilateral adrenal glands, and bilateral kidneys appear   unremarkable on this nonenhanced study. No calcific focus either kidney to   suggest presence of stone. No gross hydronephrosis. Stomach and small bowel loops are decompressed. There is an unremarkable   appearance of the appendix. Stool and air present through the colon. No CT   evidence for appendicitis or diverticulitis. No ascites. CXR Results  (Last 48 hours)    None          Medical Decision Making and ED Course   I am the first provider for this patient.     I reviewed the vital signs, available nursing notes, past medical history, past surgical history, family history and social history. Vital Signs-Reviewed the patient's vital signs. No data found. Records Reviewed: Nursing Notes    Provider Notes (Medical Decision Making): The patient presents with abdominal pain with a differential diagnosis of abdominal pain, appendicitis, pregnancy, renal Colic and UTI    Pt with fairly benign exam. Hematuria and RLQ tenderness so CT was done for eval, but no acute findings. Will tx with 3 days atbx for cystitis, discussed restarting her sz meds (last sz 3months ago) and advised against driving until several days of sz meds. ED Course:   Initial assessment performed. The patients presenting problems have been discussed, and they are in agreement with the care plan formulated and outlined with them. I have encouraged them to ask questions as they arise throughout their visit. Disposition       Discharged      DISCHARGE PLAN:  1. Current Discharge Medication List      CONTINUE these medications which have NOT CHANGED    Details   carBAMazepine (TEGretol) 200 mg tablet TAKE 1 TABLET BY MOUTH TWICE A DAY      clonazePAM (KlonoPIN) 1 mg tablet TAKE 1-2 TABLETS BY MOUTH AS NEEDED FOR SLEEP/NIGHT TERRORS. NO MORE THAN 2 TABS DAILY/ LAST 30 DAYS  Qty: 60 Tab, Refills: 0    Comments: Not to exceed 5 additional fills before 11/15/2020REFILL. Associated Diagnoses: Primary insomnia; Anxiety attack      fluconazole (DIFLUCAN) 150 mg tablet Take 1 Tab by mouth daily. Repeat in 3 days if needed. Qty: 2 Tab, Refills: 0    Associated Diagnoses: Yeast vaginitis           Discharge Medication List as of 9/17/2020  2:07 PM      START taking these medications    Details   trimethoprim-sulfamethoxazole (Bactrim DS) 160-800 mg per tablet Take 1 Tab by mouth two (2) times a day for 3 days. , Normal, Disp-6 Tab,R-0         CONTINUE these medications which have NOT CHANGED    Details   carBAMazepine (TEGretol) 200 mg tablet TAKE 1 TABLET BY MOUTH TWICE A DAY, Historical Med      clonazePAM (KlonoPIN) 1 mg tablet TAKE 1-2 TABLETS BY MOUTH AS NEEDED FOR SLEEP/NIGHT TERRORS. NO MORE THAN 2 TABS DAILY/ LAST 30 DAYS, Normal, Disp-60 Tab,R-0Not to exceed 5 additional fills before 11/15/2020REFILL. fluconazole (DIFLUCAN) 150 mg tablet Take 1 Tab by mouth daily. Repeat in 3 days if needed., Normal, Disp-2 Tab,R-0           2. Follow-up Information     Follow up With Specialties Details Why Contact Info    Blu Euceda NP Nurse Practitioner Schedule an appointment as soon as possible for a visit   [address]  [address] [address]  [phone]          3. Return to ED if worse     Diagnosis     Clinical Impression:   1. Cystitis        Attestations:    Reid Avelar MD    Please note that this dictation was completed with [employer], the computer voice recognition software. Quite often unanticipated grammatical, syntax, homophones, and other interpretive errors are inadvertently transcribed by the computer software. Please disregard these errors. Please excuse any errors that have escaped final proofreading. Thank you.

## 2020-10-30 DIAGNOSIS — F51.01 PRIMARY INSOMNIA: ICD-10-CM

## 2020-10-30 DIAGNOSIS — F41.0 ANXIETY ATTACK: ICD-10-CM

## 2020-10-30 RX ORDER — CLONAZEPAM 1 MG/1
TABLET ORAL
Qty: 60 TAB | Refills: 0 | OUTPATIENT
Start: 2020-10-30

## 2020-11-02 NOTE — TELEPHONE ENCOUNTER
Patient would like a call regarding why her script was not filled at this time.  Patient's phone: [phone]

## 2020-11-02 NOTE — TELEPHONE ENCOUNTER
Pt was left  last week notifying a med check appt is required before filling controlled substance. Again nurse explained via  appt is required. Please assist in scheduling appt.

## 2021-03-16 ENCOUNTER — TELEPHONE (OUTPATIENT)
Dept: FAMILY MEDICINE CLINIC | Age: 26
End: 2021-03-16

## 2021-03-16 ENCOUNTER — HOSPITAL ENCOUNTER (EMERGENCY)
Age: 26
Discharge: HOME | End: 2021-03-17
Attending: EMERGENCY MEDICINE
Payer: COMMERCIAL

## 2021-03-16 ENCOUNTER — APPOINTMENT (OUTPATIENT)
Dept: GENERAL RADIOLOGY | Age: 26
End: 2021-03-16
Attending: EMERGENCY MEDICINE
Payer: COMMERCIAL

## 2021-03-16 DIAGNOSIS — T07.XXXA MULTIPLE ABRASIONS: ICD-10-CM

## 2021-03-16 DIAGNOSIS — S00.03XA HEMATOMA OF SCALP, INITIAL ENCOUNTER: ICD-10-CM
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
## 2021-03-16 DIAGNOSIS — S30.1XXA CONTUSION OF ABDOMINAL WALL, INITIAL ENCOUNTER: ICD-10-CM

## 2021-03-16 PROCEDURE — 96376 TX/PRO/DX INJ SAME DRUG ADON: CPT

## 2021-03-16 PROCEDURE — 80048 BASIC METABOLIC PNL TOTAL CA: CPT

## 2021-03-16 PROCEDURE — 84703 CHORIONIC GONADOTROPIN ASSAY: CPT

## 2021-03-16 PROCEDURE — 74011250636 HC RX REV CODE- 250/636: Performed by: EMERGENCY MEDICINE

## 2021-03-16 PROCEDURE — 83605 ASSAY OF LACTIC ACID: CPT

## 2021-03-16 PROCEDURE — 85027 COMPLETE CBC AUTOMATED: CPT

## 2021-03-16 PROCEDURE — 71045 X-RAY EXAM CHEST 1 VIEW: CPT

## 2021-03-16 PROCEDURE — 82077 ASSAY SPEC XCP UR&BREATH IA: CPT

## 2021-03-16 PROCEDURE — 36415 COLL VENOUS BLD VENIPUNCTURE: CPT

## 2021-03-16 PROCEDURE — 73552 X-RAY EXAM OF FEMUR 2/>: CPT

## 2021-03-16 PROCEDURE — 96374 THER/PROPH/DIAG INJ IV PUSH: CPT

## 2021-03-16 PROCEDURE — 73030 X-RAY EXAM OF SHOULDER: CPT

## 2021-03-16 PROCEDURE — 96375 TX/PRO/DX INJ NEW DRUG ADDON: CPT

## 2021-03-16 PROCEDURE — 99284 EMERGENCY DEPT VISIT MOD MDM: CPT

## 2021-03-16 RX ORDER — MORPHINE SULFATE 4 MG/ML
4 INJECTION INTRAVENOUS ONCE
Status: COMPLETED | OUTPATIENT
Start: 2021-03-16 | End: 2021-03-17

## 2021-03-16 RX ORDER — CARBAMAZEPINE 200 MG/1
200 TABLET ORAL
Status: COMPLETED | OUTPATIENT
Start: 2021-03-16 | End: 2021-03-17

## 2021-03-16 RX ADMIN — SODIUM CHLORIDE 1000 ML: 9 INJECTION, SOLUTION INTRAVENOUS at 23:41

## 2021-03-17 ENCOUNTER — APPOINTMENT (OUTPATIENT)
Dept: CT IMAGING | Age: 26
End: 2021-03-17
Attending: EMERGENCY MEDICINE
Payer: COMMERCIAL

## 2021-03-17 ENCOUNTER — APPOINTMENT (OUTPATIENT)
Dept: GENERAL RADIOLOGY | Age: 26
End: 2021-03-17
Attending: EMERGENCY MEDICINE
Payer: COMMERCIAL

## 2021-03-17 VITALS
HEART RATE: 94 BPM | WEIGHT: 120 LBS | RESPIRATION RATE: 22 BRPM | HEIGHT: 62 IN | SYSTOLIC BLOOD PRESSURE: 117 MMHG | DIASTOLIC BLOOD PRESSURE: 72 MMHG | BODY MASS INDEX: 22.08 KG/M2 | OXYGEN SATURATION: 99 % | TEMPERATURE: 97.4 F

## 2021-03-17 LAB
AMPHET UR QL SCN: NEGATIVE
ANION GAP SERPL CALC-SCNC: 9 MMOL/L (ref 5–15)
APPEARANCE UR: CLEAR
BACTERIA URNS QL MICRO: NEGATIVE /HPF
BARBITURATES UR QL SCN: NEGATIVE
BENZODIAZ UR QL: NEGATIVE
BILIRUB UR QL: NEGATIVE
BUN SERPL-MCNC: 14 MG/DL (ref 6–20)
BUN/CREAT SERPL: 18 (ref 12–20)
CA-I BLD-MCNC: 8.8 MG/DL (ref 8.5–10.1)
CANNABINOIDS UR QL SCN: NEGATIVE
CHLORIDE SERPL-SCNC: 105 MMOL/L (ref 97–108)
CO2 SERPL-SCNC: 24 MMOL/L (ref 21–32)
COCAINE UR QL SCN: NEGATIVE
COLOR UR: ABNORMAL
CREAT SERPL-MCNC: 0.78 MG/DL (ref 0.55–1.02)
DRUG SCRN COMMENT,DRGCM: ABNORMAL
ERYTHROCYTE [DISTWIDTH] IN BLOOD BY AUTOMATED COUNT: 12.6 % (ref 11.5–14.5)
ETHANOL SERPL-MCNC: 79 MG/DL
GLUCOSE SERPL-MCNC: 126 MG/DL (ref 65–100)
GLUCOSE UR STRIP.AUTO-MCNC: NEGATIVE MG/DL
HCG SERPL QL: NEGATIVE
HCT VFR BLD AUTO: 38.3 % (ref 35–47)
HGB BLD-MCNC: 13 G/DL (ref 11.5–16)
HGB UR QL STRIP: ABNORMAL
KETONES UR QL STRIP.AUTO: NEGATIVE MG/DL
LACTATE SERPL-SCNC: 2.5 MMOL/L (ref 0.4–2)
LEUKOCYTE ESTERASE UR QL STRIP.AUTO: NEGATIVE
MCH RBC QN AUTO: 31.7 PG (ref 26–34)
MCHC RBC AUTO-ENTMCNC: 33.9 G/DL (ref 30–36.5)
MCV RBC AUTO: 93.4 FL (ref 80–99)
METHADONE UR QL: NEGATIVE
MUCOUS THREADS URNS QL MICRO: ABNORMAL /LPF
NITRITE UR QL STRIP.AUTO: NEGATIVE
NRBC # BLD: 0 K/UL (ref 0–0.01)
NRBC BLD-RTO: 0 PER 100 WBC
OPIATES UR QL: POSITIVE
PCP UR QL: NEGATIVE
PH UR STRIP: 5 [PH] (ref 5–8)
PLATELET # BLD AUTO: 282 K/UL (ref 150–400)
PMV BLD AUTO: 10.5 FL (ref 8.9–12.9)
POTASSIUM SERPL-SCNC: 3.7 MMOL/L (ref 3.5–5.1)
PROT UR STRIP-MCNC: NEGATIVE MG/DL
RBC # BLD AUTO: 4.1 M/UL (ref 3.8–5.2)
RBC #/AREA URNS HPF: ABNORMAL /HPF (ref 0–5)
SODIUM SERPL-SCNC: 138 MMOL/L (ref 136–145)
SP GR UR REFRACTOMETRY: >1.03 (ref 1–1.03)
UA: UC IF INDICATED,UAUC: ABNORMAL
UROBILINOGEN UR QL STRIP.AUTO: 0.1 EU/DL (ref 0.1–1)
WBC # BLD AUTO: 17 K/UL (ref 3.6–11)
WBC URNS QL MICRO: ABNORMAL /HPF (ref 0–4)

## 2021-03-17 PROCEDURE — 74011000636 HC RX REV CODE- 636: Performed by: EMERGENCY MEDICINE

## 2021-03-17 PROCEDURE — 71275 CT ANGIOGRAPHY CHEST: CPT

## 2021-03-17 PROCEDURE — 70486 CT MAXILLOFACIAL W/O DYE: CPT

## 2021-03-17 PROCEDURE — 74174 CTA ABD&PLVS W/CONTRAST: CPT

## 2021-03-17 PROCEDURE — 81001 URINALYSIS AUTO W/SCOPE: CPT

## 2021-03-17 PROCEDURE — 87086 URINE CULTURE/COLONY COUNT: CPT

## 2021-03-17 PROCEDURE — 74011250636 HC RX REV CODE- 250/636: Performed by: EMERGENCY MEDICINE

## 2021-03-17 PROCEDURE — 74011250637 HC RX REV CODE- 250/637: Performed by: EMERGENCY MEDICINE

## 2021-03-17 PROCEDURE — 70450 CT HEAD/BRAIN W/O DYE: CPT

## 2021-03-17 PROCEDURE — 72125 CT NECK SPINE W/O DYE: CPT

## 2021-03-17 PROCEDURE — 80307 DRUG TEST PRSMV CHEM ANLYZR: CPT

## 2021-03-17 PROCEDURE — 73562 X-RAY EXAM OF KNEE 3: CPT

## 2021-03-17 RX ORDER — MORPHINE SULFATE 4 MG/ML
4 INJECTION INTRAVENOUS ONCE
Status: COMPLETED | OUTPATIENT
Start: 2021-03-17 | End: 2021-03-17

## 2021-03-17 RX ORDER — ETOMIDATE 2 MG/ML
20 INJECTION INTRAVENOUS ONCE
Status: DISCONTINUED | OUTPATIENT
Start: 2021-03-17 | End: 2021-03-17 | Stop reason: CLARIF

## 2021-03-17 RX ORDER — ONDANSETRON 2 MG/ML
4 INJECTION INTRAMUSCULAR; INTRAVENOUS
Status: COMPLETED | OUTPATIENT
Start: 2021-03-17 | End: 2021-03-17

## 2021-03-17 RX ORDER — SUCCINYLCHOLINE CHLORIDE 20 MG/ML INJECTION SOLUTION
100 SOLUTION
Status: DISCONTINUED | OUTPATIENT
Start: 2021-03-17 | End: 2021-03-17 | Stop reason: CLARIF

## 2021-03-17 RX ORDER — OXYCODONE AND ACETAMINOPHEN 5; 325 MG/1; MG/1
1 TABLET ORAL
Qty: 12 TAB | Refills: 0 | Status: SHIPPED | OUTPATIENT
Start: 2021-03-17 | End: 2021-03-20

## 2021-03-17 RX ADMIN — MORPHINE SULFATE 4 MG: 4 INJECTION, SOLUTION INTRAMUSCULAR; INTRAVENOUS at 01:30

## 2021-03-17 RX ADMIN — PROCHLORPERAZINE EDISYLATE 5 MG: 5 INJECTION INTRAMUSCULAR; INTRAVENOUS at 03:49

## 2021-03-17 RX ADMIN — ONDANSETRON 4 MG: 2 INJECTION INTRAMUSCULAR; INTRAVENOUS at 00:45

## 2021-03-17 RX ADMIN — CARBAMAZEPINE 200 MG: 200 TABLET ORAL at 00:48

## 2021-03-17 RX ADMIN — IOPAMIDOL 100 ML: 755 INJECTION, SOLUTION INTRAVENOUS at 00:30

## 2021-03-17 RX ADMIN — MORPHINE SULFATE 4 MG: 4 INJECTION, SOLUTION INTRAMUSCULAR; INTRAVENOUS at 00:48
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
## 2021-03-18 ENCOUNTER — TELEPHONE (OUTPATIENT)
Dept: FAMILY MEDICINE CLINIC | Age: 26
End: 2021-03-18

## 2021-03-18 LAB
BACTERIA SPEC CULT: NORMAL
COLONY COUNT,CNT: NORMAL
COLONY COUNT,CNT: NORMAL
SPECIAL REQUESTS,SREQ: NORMAL

## 2021-03-18 RX ORDER — ONDANSETRON 8 MG/1
8 TABLET, ORALLY DISINTEGRATING ORAL
Qty: 30 TAB | Refills: 1 | Status: SHIPPED | OUTPATIENT
Start: 2021-03-18 | End: 2021-06-08 | Stop reason: SDUPTHER

## 2021-03-18 RX ORDER — ONDANSETRON 8 MG/1
8 TABLET, ORALLY DISINTEGRATING ORAL
Qty: 30 TAB | Refills: 1 | Status: SHIPPED | OUTPATIENT
Start: 2021-03-18 | End: 2021-03-18

## 2021-03-18 NOTE — TELEPHONE ENCOUNTER
Zofran sent to pharmacy. She can try [facility] AT [facility] orthopedics Call [phone].

## 2021-03-18 NOTE — TELEPHONE ENCOUNTER
Please send to pharmacy [facility] and [facility] new [facility] in. Pt was provided information for ortho.
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
## 2021-03-22 ENCOUNTER — TELEPHONE (OUTPATIENT)
Dept: FAMILY MEDICINE CLINIC | Age: 26
End: 2021-03-22
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
## 2021-03-23 ENCOUNTER — APPOINTMENT (OUTPATIENT)
Dept: GENERAL RADIOLOGY | Age: 26
End: 2021-03-23
Attending: EMERGENCY MEDICINE
Payer: COMMERCIAL

## 2021-03-23 ENCOUNTER — HOSPITAL ENCOUNTER (EMERGENCY)
Age: 26
Discharge: HOME | End: 2021-03-23
Attending: EMERGENCY MEDICINE
Payer: COMMERCIAL

## 2021-03-23 VITALS
DIASTOLIC BLOOD PRESSURE: 75 MMHG | WEIGHT: 120 LBS | RESPIRATION RATE: 18 BRPM | HEIGHT: 62 IN | TEMPERATURE: 98.2 F | OXYGEN SATURATION: 100 % | SYSTOLIC BLOOD PRESSURE: 109 MMHG | BODY MASS INDEX: 22.08 KG/M2 | HEART RATE: 100 BPM

## 2021-03-23 DIAGNOSIS — M25.561 ACUTE PAIN OF RIGHT KNEE: Primary | ICD-10-CM

## 2021-03-23 PROCEDURE — 73562 X-RAY EXAM OF KNEE 3: CPT

## 2021-03-23 PROCEDURE — 99283 EMERGENCY DEPT VISIT LOW MDM: CPT

## 2021-03-23 RX ORDER — NAPROXEN 500 MG/1
500 TABLET ORAL 2 TIMES DAILY WITH MEALS
Qty: 60 TAB | Refills: 2 | Status: SHIPPED
Start: 2021-03-23 | End: 2021-06-08

## 2021-03-23 RX ORDER — HYDROCODONE BITARTRATE AND ACETAMINOPHEN 5; 325 MG/1; MG/1
1 TABLET ORAL
Qty: 8 TAB | Refills: 0 | Status: SHIPPED | OUTPATIENT
Start: 2021-03-23 | End: 2021-03-25
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
## 2021-03-23 NOTE — DISCHARGE INSTRUCTIONS
Take the Norco as prescribed as needed for pain. Do not drive, work, operate machinery, or drink alcohol with taking this medication. Use the knee immobilizer as needed. Follow-up with your doctor in 3 days as scheduled. Return to the emergency department with any worsening, new, or worrisome symptoms.
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
## 2021-03-23 NOTE — LETTER
[address] [place] [id number] [phone] Work/School Note Date: 3/23/2021 To Whom It May concern: 
 
Clotilde Díaz was seen and treated today in the emergency room by the following provider(s): 
Attending Provider: Jameson Gregory, [address] can return to work on 3- Sincerely, 
 
 
 
 
Dulce Murphy RN

## 2021-06-04 ENCOUNTER — TELEPHONE (OUTPATIENT)
Dept: FAMILY MEDICINE CLINIC | Age: 26
End: 2021-06-04

## 2021-06-04 NOTE — TELEPHONE ENCOUNTER
----- Message from [place] sent at 6/4/2021 12:39 PM EDT -----  Regarding: CHIDI Castle/Telephone  Contact: [phone]  General Message/Vendor Calls    Caller's first and last name: N/A      Reason for call: Requesting prescription for Wellbutrin      Callback required yes/no and why: yes/follow up       Best contact number(s): [phone]      Details to clarify the request: Would also like to know if it is ok to use tegretol as mood stablier ( PT currently used for Epilepsy). PT normally takes Wellbutrin twice a day and would like to know if she needs to continue using twice or once a day, since it has been so long since she last took it. PT having trouble scheduling appt with psychiatrist - PT is on waiting through [facility].        [place]

## 2021-06-04 NOTE — TELEPHONE ENCOUNTER
Please clarify. I have not seen pt in over a year. Who is prescribing the wellbutrin and at what dose. Needs to discuss tegretol with psych, I don't prescribe this med.

## 2021-06-07 NOTE — TELEPHONE ENCOUNTER
Pt states she is already being prescribed tegretol by another physician  Pt wanted to restart wellbutrin until she can be seen by gerald-pt has not seen gerald in about 2-3 yrs. Advised appt is required to discuss restarting med since pt has not been seen at [facility] in over a yr. VV appt scheduled for med check.

## 2021-06-08 ENCOUNTER — VIRTUAL VISIT (OUTPATIENT)
Dept: FAMILY MEDICINE CLINIC | Age: 26
End: 2021-06-08
Payer: COMMERCIAL

## 2021-06-08 DIAGNOSIS — F31.9 BIPOLAR AFFECTIVE DISORDER, REMISSION STATUS UNSPECIFIED (HCC): Primary | ICD-10-CM

## 2021-06-08 DIAGNOSIS — F51.01 PRIMARY INSOMNIA: ICD-10-CM

## 2021-06-08 DIAGNOSIS — F41.0 ANXIETY ATTACK: ICD-10-CM

## 2021-06-08 PROBLEM — G40.909 EPILEPSY (HCC): Status: ACTIVE | Noted: 2021-06-08

## 2021-06-08 PROCEDURE — 99213 OFFICE O/P EST LOW 20 MIN: CPT | Performed by: NURSE PRACTITIONER

## 2021-06-08 RX ORDER — CLONAZEPAM 1 MG/1
TABLET ORAL
Qty: 10 TABLET | Refills: 0 | OUTPATIENT
Start: 2021-06-08 | End: 2022-04-16

## 2021-06-08 RX ORDER — ONDANSETRON 8 MG/1
8 TABLET, ORALLY DISINTEGRATING ORAL
Qty: 30 TABLET | Refills: 1 | Status: SHIPPED | OUTPATIENT
Start: 2021-06-08 | End: 2021-08-02

## 2021-06-08 RX ORDER — ARIPIPRAZOLE 10 MG/1
10 TABLET ORAL DAILY
Qty: 30 TABLET | Refills: 2 | Status: SHIPPED | OUTPATIENT
Start: 2021-06-08 | End: 2021-08-02

## 2021-06-08 RX ORDER — CLONAZEPAM 1 MG/1
TABLET ORAL
Qty: 30 TABLET | Refills: 0 | Status: SHIPPED | OUTPATIENT
Start: 2021-06-08 | End: 2021-06-08 | Stop reason: SDUPTHER

## 2021-06-08 NOTE — PROGRESS NOTES
Syl Egan is a 22 y.o. female who was seen by synchronous (real-time) audio-video technology on 6/8/2021 for Depression and Medication Evaluation        Assessment & Plan:   Diagnoses and all orders for this visit:    1. Bipolar affective disorder, remission status unspecified (HCC)  -     ARIPiprazole (ABILIFY) 10 mg tablet; Take 1 Tablet by mouth daily. Discuss new med with neurologist prior to starting due to history of seizure disorder. Continue with plan to est care with psych for ongoing management. Reviewed SEs/ADRs of medication. 2. Primary insomnia / 3. Anxiety attack  -     clonazePAM (KlonoPIN) 1 mg tablet; TAKE 1 TABLETS BY MOUTH AS NEEDED FOR SLEEP. Small supply sent to pharmacy to tie pt over until she can see psych. Other orders  -     ondansetron (ZOFRAN ODT) 8 mg disintegrating tablet; Take 1 Tablet by mouth every eight (8) hours as needed for Nausea or Vomiting. I spent at least 15 minutes on this visit with this established patient. 712  Subjective:     Chief Complaint   Patient presents with    Depression    Medication Evaluation     Patient vv appt today to discuss restarting wellbutrin. In the process of trying to get an appt with Psych, district [facility]. Pt is currently in therapy. Pt states she noted good results in the past with medication, but did note was taking a mood stabilizer with wellbutrin. Denies SEs of the medication in the past. Didn't make her more manic or depressed. Has a history of bipolar. Pt have been off medication for about 2-3 yrs, currently taking tegretol as mood stabilizer. Also for her epilepsy. Was diagnosed 3 years ago. Was taking klonopin as needed for insomnia to calm her down. Using only 2-3 nights a week. Pt would also like a refill of zofran. Prior to Admission medications    Medication Sig Start Date End Date Taking?  Authorizing Provider   ondansetron (ZOFRAN ODT) 8 mg disintegrating tablet Take 1 Tab by mouth every eight (8) hours as needed for Nausea or Vomiting. 3/18/21  Yes Wong Sevilla NP   carBAMazepine (TEGretol) 200 mg tablet TAKE 1 TABLET BY MOUTH TWICE A DAY 6/30/20  Yes Other, MD Lida   naproxen (NAPROSYN) 500 mg tablet Take 1 Tab by mouth two (2) times daily (with meals). Patient not taking: Reported on 6/8/2021 3/23/21 6/8/21  Wong Sevilla NP     Patient Active Problem List    Diagnosis Date Noted    History of suicide attempt 04/09/2020    Bipolar disorder, unspecified ([facility] [address] 09/06/2016    IUD (intrauterine device) in place 09/06/2016    Family history not obtainable due to adoption 09/06/2016     Current Outpatient Medications   Medication Sig Dispense Refill    ondansetron (ZOFRAN ODT) 8 mg disintegrating tablet Take 1 Tablet by mouth every eight (8) hours as needed for Nausea or Vomiting. 30 Tablet 1    ARIPiprazole (ABILIFY) 10 mg tablet Take 1 Tablet by mouth daily. 30 Tablet 2    clonazePAM (KlonoPIN) 1 mg tablet TAKE 1 TABLETS BY MOUTH AS NEEDED FOR SLEEP. 10 Tablet 0    carBAMazepine (TEGretol) 200 mg tablet TAKE 1 TABLET BY MOUTH TWICE A DAY       Allergies   Allergen Reactions    Lamictal [Lamotrigine] Rash    Peanut Hives    Peanuts [Peanut Oil] Hives       ROS    Objective:   No flowsheet data found. General: alert, cooperative, no distress   Mental  status: normal mood, behavior, speech, dress, motor activity, and thought processes, able to follow commands   HENT: NCAT   Neck: no visualized mass   Resp: no respiratory distress                 Additional exam findings: We discussed the expected course, resolution and complications of the diagnosis(es) in detail. Medication risks, benefits, costs, interactions, and alternatives were discussed as indicated. I advised her to contact the office if her condition worsens, changes or fails to improve as anticipated. She expressed understanding with the diagnosis(es) and plan.      Aaron Abarca, was evaluated through a synchronous (real-time) audio-video encounter. The patient (or guardian if applicable) is aware that this is a billable service. Verbal consent to proceed has been obtained within the past 12 months. The visit was conducted pursuant to the emergency declaration under the [facility], [address] [place] [address] authority and the [employer] Act. Patient identification was verified, and a caregiver was present when appropriate. The patient was located in a state where the provider was credentialed to provide care.     Lucy Jackson NP

## 2021-06-08 NOTE — PROGRESS NOTES
Chief Complaint   Patient presents with    Depression    Medication Evaluation     Patient vv appt today to discuss restarting wellbutrin. Pt states she noted good results in the past with medication,but did note was taking a mood stabilizer with wellbutrin    Pt have been off medication for 2-3yrs ago,currently taking tegretol as mood stabilizer. Pt would like refill of zofran. 1. Have you been to the ER, urgent care clinic since your last visit? Hospitalized since your last visit? No    2. Have you seen or consulted any other health care providers outside of the [address] [place] [address] since your last visit? Include any pap smears or colon screening.  No

## 2021-08-02 ENCOUNTER — HOSPITAL ENCOUNTER (EMERGENCY)
Age: 26
Discharge: HOME | End: 2021-08-03
Attending: FAMILY MEDICINE | Admitting: PSYCHIATRY & NEUROLOGY
Payer: COMMERCIAL

## 2021-08-02 DIAGNOSIS — R45.851 SUICIDAL IDEATION: ICD-10-CM

## 2021-08-02 DIAGNOSIS — S09.93XA FACIAL INJURY, INITIAL ENCOUNTER: Primary | ICD-10-CM
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
## 2021-08-02 LAB
GLUCOSE BLD STRIP.AUTO-MCNC: 113 MG/DL (ref 65–117)
PERFORMED BY, TECHID: NORMAL

## 2021-08-02 PROCEDURE — 82962 GLUCOSE BLOOD TEST: CPT

## 2021-08-02 PROCEDURE — 99285 EMERGENCY DEPT VISIT HI MDM: CPT

## 2021-08-02 RX ORDER — ATOMOXETINE 40 MG/1
40 CAPSULE ORAL DAILY
COMMUNITY

## 2021-08-02 NOTE — Clinical Note
Status[de-identified] INPATIENT [101]   Type of Bed: Behavioral Health Acute [27]   Cardiac Monitoring Required?: No   Inpatient Hospitalization Certified Necessary for the Following Reasons: 3. Patient receiving treatment that can only be provided in an inpatient setting (further clarification in H&P documentation)   Admitting Diagnosis: Suicidal ideation [V62.84. ICD-9-CM]   Admitting Physician: Brisa Bowling [[id number]]   Attending Physician: Brisa Bowling [[id number]]   Estimated Length of Stay: 3-4 Midnights   Discharge Plan[de-identified] Other (Specify)

## 2021-08-03 ENCOUNTER — APPOINTMENT (OUTPATIENT)
Dept: CT IMAGING | Age: 26
End: 2021-08-03
Attending: FAMILY MEDICINE
Payer: COMMERCIAL

## 2021-08-03 ENCOUNTER — APPOINTMENT (OUTPATIENT)
Dept: GENERAL RADIOLOGY | Age: 26
End: 2021-08-03
Attending: FAMILY MEDICINE
Payer: COMMERCIAL

## 2021-08-03 VITALS
SYSTOLIC BLOOD PRESSURE: 118 MMHG | TEMPERATURE: 98.4 F | OXYGEN SATURATION: 99 % | DIASTOLIC BLOOD PRESSURE: 74 MMHG | WEIGHT: 120 LBS | RESPIRATION RATE: 20 BRPM | HEART RATE: 92 BPM | BODY MASS INDEX: 22.08 KG/M2 | HEIGHT: 62 IN

## 2021-08-03 PROBLEM — R45.851 SUICIDAL IDEATION: Status: ACTIVE | Noted: 2021-08-03

## 2021-08-03 LAB
ALBUMIN SERPL-MCNC: 4.6 G/DL (ref 3.5–5)
ALBUMIN/GLOB SERPL: 1.1 {RATIO} (ref 1.1–2.2)
ALP SERPL-CCNC: 90 U/L (ref 45–117)
ALT SERPL-CCNC: 29 U/L (ref 12–78)
AMPHET UR QL SCN: NEGATIVE
ANION GAP SERPL CALC-SCNC: 15 MMOL/L (ref 5–15)
AST SERPL W P-5'-P-CCNC: 33 U/L (ref 15–37)
BARBITURATES UR QL SCN: NEGATIVE
BASOPHILS # BLD: 0 K/UL (ref 0–0.1)
BASOPHILS NFR BLD: 0 % (ref 0–1)
BENZODIAZ UR QL: NEGATIVE
BILIRUB SERPL-MCNC: 0.5 MG/DL (ref 0.2–1)
BUN SERPL-MCNC: 19 MG/DL (ref 6–20)
BUN/CREAT SERPL: 22 (ref 12–20)
BUPRENORPHINE UR QL: NEGATIVE
CA-I BLD-MCNC: 8.7 MG/DL (ref 8.5–10.1)
CANNABINOIDS UR QL SCN: POSITIVE
CHLORIDE SERPL-SCNC: 102 MMOL/L (ref 97–108)
CO2 SERPL-SCNC: 21 MMOL/L (ref 21–32)
COCAINE UR QL SCN: NEGATIVE
CREAT SERPL-MCNC: 0.88 MG/DL (ref 0.55–1.02)
DIFFERENTIAL METHOD BLD: ABNORMAL
EOSINOPHIL # BLD: 0 K/UL (ref 0–0.4)
EOSINOPHIL NFR BLD: 0 % (ref 0–7)
ERYTHROCYTE [DISTWIDTH] IN BLOOD BY AUTOMATED COUNT: 11.9 % (ref 11.5–14.5)
ETHANOL SERPL-MCNC: 72 MG/DL
GLOBULIN SER CALC-MCNC: 4.1 G/DL (ref 2–4)
GLUCOSE SERPL-MCNC: 62 MG/DL (ref 65–100)
HCG UR QL: NEGATIVE
HCT VFR BLD AUTO: 40.2 % (ref 35–47)
HGB BLD-MCNC: 13.8 G/DL (ref 11.5–16)
IMM GRANULOCYTES # BLD AUTO: 0.1 K/UL (ref 0–0.04)
IMM GRANULOCYTES NFR BLD AUTO: 0 % (ref 0–0.5)
LYMPHOCYTES # BLD: 1.8 K/UL (ref 0.8–3.5)
LYMPHOCYTES NFR BLD: 9 % (ref 12–49)
MCH RBC QN AUTO: 31.7 PG (ref 26–34)
MCHC RBC AUTO-ENTMCNC: 34.3 G/DL (ref 30–36.5)
MCV RBC AUTO: 92.2 FL (ref 80–99)
METHADONE UR QL: NEGATIVE
METHAMPHET UR QL: NEGATIVE
MONOCYTES # BLD: 1.1 K/UL (ref 0–1)
MONOCYTES NFR BLD: 6 % (ref 5–13)
NEUTS SEG # BLD: 16.4 K/UL (ref 1.8–8)
NEUTS SEG NFR BLD: 85 % (ref 32–75)
OPIATES UR QL: NEGATIVE
OXYCODONE UR QL SCN: NEGATIVE
PCP UR QL: NEGATIVE
PLATELET # BLD AUTO: 268 K/UL (ref 150–400)
PMV BLD AUTO: 10 FL (ref 8.9–12.9)
POTASSIUM SERPL-SCNC: 3.9 MMOL/L (ref 3.5–5.1)
PROPOXYPH UR QL: NEGATIVE
PROT SERPL-MCNC: 8.7 G/DL (ref 6.4–8.2)
RBC # BLD AUTO: 4.36 M/UL (ref 3.8–5.2)
SARS-COV-2, COV2: NOT DETECTED
SODIUM SERPL-SCNC: 138 MMOL/L (ref 136–145)
TRICYCLICS UR QL: NEGATIVE
WBC # BLD AUTO: 19.3 K/UL (ref 3.6–11)

## 2021-08-03 PROCEDURE — 74011000636 HC RX REV CODE- 636: Performed by: FAMILY MEDICINE

## 2021-08-03 PROCEDURE — 70498 CT ANGIOGRAPHY NECK: CPT

## 2021-08-03 PROCEDURE — 71101 X-RAY EXAM UNILAT RIBS/CHEST: CPT

## 2021-08-03 PROCEDURE — 70486 CT MAXILLOFACIAL W/O DYE: CPT

## 2021-08-03 PROCEDURE — 87635 SARS-COV-2 COVID-19 AMP PRB: CPT

## 2021-08-03 PROCEDURE — 81025 URINE PREGNANCY TEST: CPT

## 2021-08-03 PROCEDURE — 74011250637 HC RX REV CODE- 250/637: Performed by: FAMILY MEDICINE

## 2021-08-03 PROCEDURE — 70450 CT HEAD/BRAIN W/O DYE: CPT

## 2021-08-03 PROCEDURE — 80307 DRUG TEST PRSMV CHEM ANLYZR: CPT

## 2021-08-03 PROCEDURE — 82077 ASSAY SPEC XCP UR&BREATH IA: CPT

## 2021-08-03 PROCEDURE — 85025 COMPLETE CBC W/AUTO DIFF WBC: CPT

## 2021-08-03 PROCEDURE — 80053 COMPREHEN METABOLIC PANEL: CPT

## 2021-08-03 RX ORDER — DIPHENHYDRAMINE HCL 25 MG
25 CAPSULE ORAL
Status: COMPLETED | OUTPATIENT
Start: 2021-08-03 | End: 2021-08-03

## 2021-08-03 RX ORDER — ACETAMINOPHEN 500 MG
1000 TABLET ORAL
Status: COMPLETED | OUTPATIENT
Start: 2021-08-03 | End: 2021-08-03

## 2021-08-03 RX ADMIN — IOPAMIDOL 100 ML: 755 INJECTION, SOLUTION INTRAVENOUS at 02:38

## 2021-08-03 RX ADMIN — ACETAMINOPHEN 1000 MG: 500 TABLET, FILM COATED ORAL at 02:00

## 2021-08-03 RX ADMIN — DIPHENHYDRAMINE HYDROCHLORIDE 25 MG: 25 CAPSULE ORAL at 02:47
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
## 2021-08-03 NOTE — ED NOTES
[facility] called for transport, ETA [phone], notified /supervisor no change, notified house supervisor of delay.
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
## 2021-08-03 NOTE — ED NOTES
Spoke with Linda from [facility] about patient, gave Linda patients counselors name and number along with the place that patient has to stay at that is safe for the patient.

## 2021-08-03 NOTE — ED NOTES
Spoke with Mercedes Fuller from forensics about patient not being admitted as previously planned. Will reach out to patient outpatient.

## 2021-08-03 NOTE — ED NOTES
Spoke with Julianna Dakin at forensics to make aware of patient request to leave, will update forensic with any information at a later time, awaiting U intake for further.

## 2021-08-03 NOTE — ED NOTES
Spoke with forensic nurse. Pt now admitting to SI without a plan. MD aware of this. Per forensic nurse, nobody is to touch pt's neck without gloves and pt is NOT to shower until forensic exam has been completed.

## 2021-08-03 NOTE — FORENSIC NURSE
FNE consulted. FNE spoke with patient over the phone. Patient reported current suicidal ideation. FNE reported patient's suicidal ideation to Magalis Rendon RN. RN to notify FNE following mental health evaluation.
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
## 2021-08-03 NOTE — ED NOTES
[place]-Suicide Severity Rating Scale completed and Safety Measures and Interventions according to pt answers requires q15min observations. MD aware.  See paper charting for monitoring checklist.

## 2021-08-03 NOTE — ED NOTES
Called [facility]. Spoke with Aristides Montenegro RN and reviewed instructions from forensic nurse. Awaiting Covid swab result and will then set up transport.

## 2021-08-03 NOTE — ED NOTES
Patient requesting to leave, no current SI, patient reports she was in her feelings this morning and is not in the same place she was earlier, patient on phone with counselor and set up a safety plan to go to her friends house and stay for a while, patient reports she has court on Thursday as she is on probation and also has a meeting with her counselor thursday, counselor asked patient to call and set up an outpatient appointment with her psychiatrist prior to leaving the hospital. placed call to [phone], no answer, will reattempt. Also called forensics staff on call Tracy Lunsford, no answer, left message to call back.
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
## 2021-08-03 NOTE — ED NOTES
Spoke with forensic nurse, Marny Collet, and informed her that pt would be admitted to [facility]. Said nurse verified that placing pt into a green gown would not affect forensic exam and only requirement would be that if anyone were to touch pt's neck, they need to be wearing gloves. [facility] Intake nurse, [facility], aware of this. Muriel Perez will be the forensic nurse at [facility] today at 1500 and would evaluate the pt on [facility].

## 2021-08-03 NOTE — ED NOTES
EMS reports that they gave pt resources for shelters and attempted to call the [facility] but was unable to reach anyone.

## 2021-08-03 NOTE — ED NOTES
[facility] updated that awaiting [facility] arrival, COVID results pending, patient updated on awaiting COVID test results

## 2021-08-03 NOTE — ED NOTES
Patient does not meet criteria for TDO at this time. Patient made safety plan with [place] [address] crisis staff and they have contacted someone who will stay with the patient for 72 hours, patient has made an appointment with her counselor and will be seeing her counselor at  today.

## 2021-09-14 ENCOUNTER — TELEPHONE (OUTPATIENT)
Dept: FAMILY MEDICINE CLINIC | Age: 26
End: 2021-09-14

## 2021-09-14 NOTE — TELEPHONE ENCOUNTER
Pt was scheduled for vv appt with German Buerger on 9/14. Have attempted to contact pt x3 with no return call. Pt has been advised via vm,will have to contact office to r/s appt.

## 2021-09-15 ENCOUNTER — HOSPITAL ENCOUNTER (EMERGENCY)
Age: 26
Discharge: HOME | End: 2021-09-15
Payer: COMMERCIAL

## 2021-09-15 VITALS
OXYGEN SATURATION: 100 % | RESPIRATION RATE: 18 BRPM | DIASTOLIC BLOOD PRESSURE: 86 MMHG | SYSTOLIC BLOOD PRESSURE: 131 MMHG | HEIGHT: 64 IN | WEIGHT: 140 LBS | TEMPERATURE: 98.2 F | BODY MASS INDEX: 23.9 KG/M2 | HEART RATE: 87 BPM

## 2021-09-15 DIAGNOSIS — R19.7 DIARRHEA, UNSPECIFIED TYPE: ICD-10-CM

## 2021-09-15 DIAGNOSIS — Z20.822 CONTACT WITH AND (SUSPECTED) EXPOSURE TO COVID-19: Primary | ICD-10-CM

## 2021-09-15 PROCEDURE — U0005 INFEC AGEN DETEC AMPLI PROBE: HCPCS

## 2021-09-15 PROCEDURE — 99282 EMERGENCY DEPT VISIT SF MDM: CPT

## 2021-09-15 RX ORDER — LOPERAMIDE HYDROCHLORIDE 2 MG/1
2 CAPSULE ORAL
Qty: 20 CAPSULE | Refills: 0 | Status: SHIPPED | OUTPATIENT
Start: 2021-09-15 | End: 2021-09-20

## 2021-09-15 NOTE — ED PROVIDER NOTES
EMERGENCY DEPARTMENT HISTORY AND PHYSICAL EXAM      Date: 9/15/2021  Patient Name: Hank Wilson    History of Presenting Illness     Chief Complaint   Patient presents with    Other     COVID testing       History Provided By: Patient    HPI: Hank Wilson, 22 y.o. female with a past medical history significant seizure presents to the ED with cc of Positive covid exposure, fatigue, and diarrhea. She reports that she has had a known COVID exposure to a close contact and over the last few days has developed fatigue and has had several episodes of diarrhea. She denies any fever, chills, body aches, seizures, or N/V. She is able to tolerate po fluids and food. She has practiced COVID precautions. She has been otherwise healthy and has had no recent illnesses. She specifically denies chest pain, shortness of breath, cough, or difficulty breathing. There are no other complaints, changes, or physical findings at this time. PCP: Khadar Aleman NP    No current facility-administered medications on file prior to encounter. Current Outpatient Medications on File Prior to Encounter   Medication Sig Dispense Refill    atomoxetine (Strattera) 40 mg capsule Take 40 mg by mouth daily.  clonazePAM (KlonoPIN) 1 mg tablet TAKE 1 TABLETS BY MOUTH AS NEEDED FOR SLEEP. 10 Tablet 0    carBAMazepine (TEGretol) 200 mg tablet TAKE 1 TABLET BY MOUTH TWICE A DAY         Past History     Past Medical History:  Past Medical History:   Diagnosis Date    Anxiety     Asthma     Bipolar 1 disorder (HCC)     Pneumonia     Seizures (HCC)     Strep throat        Past Surgical History:  Past Surgical History:   Procedure Laterality Date    HX HEENT      strabismus correction        Family History:  History reviewed. No pertinent family history.     Social History:  Social History     Tobacco Use    Smoking status: Former Smoker     Packs/day: 0.50    Smokeless tobacco: Current User   Substance Use Topics    Alcohol use: Yes     Alcohol/week: 15.0 standard drinks     Types: 15 Cans of beer per week     Comment: daily beer use/ 15/week    Drug use: Yes     Frequency: 1.0 times per week     Types: Cocaine, Methamphetamines, Other     Comment: crystal meth       Allergies: Allergies   Allergen Reactions    Bee Pollen Swelling     Bee sting    Lamictal [Lamotrigine] Rash    Peanut Hives    Peanuts [Peanut Oil] Hives         Review of Systems     Review of Systems   Constitutional: Positive for fatigue. Negative for chills and fever. HENT: Negative for ear pain, postnasal drip, rhinorrhea, sinus pressure, sore throat and trouble swallowing. Eyes: Negative for discharge and visual disturbance. Respiratory: Negative for cough, chest tightness, shortness of breath and wheezing. Cardiovascular: Negative for chest pain, palpitations and leg swelling. Gastrointestinal: Positive for diarrhea. Negative for abdominal distention, abdominal pain, blood in stool, constipation, nausea and vomiting. Endocrine: Negative. Genitourinary: Negative for dysuria, frequency and urgency. Musculoskeletal: Negative for back pain, myalgias, neck pain and neck stiffness. Skin: Negative for rash. Allergic/Immunologic: Negative. Neurological: Negative for dizziness, syncope, weakness and headaches. Hematological: Does not bruise/bleed easily. Psychiatric/Behavioral: Negative for confusion, hallucinations, sleep disturbance and suicidal ideas. The patient is not nervous/anxious. All other systems reviewed and are negative. Physical Exam     Physical Exam  Vitals and nursing note reviewed. Constitutional:       Appearance: She is well-developed and normal weight. HENT:      Head: Normocephalic and atraumatic. Right Ear: External ear normal.      Left Ear: External ear normal.      Nose: Nose normal.      Mouth/Throat:      Pharynx: Oropharynx is clear.    Eyes:      Extraocular Movements: Extraocular movements intact. Conjunctiva/sclera: Conjunctivae normal.      Pupils: Pupils are equal, round, and reactive to light. Neck:      Vascular: No JVD. Trachea: No tracheal deviation. Cardiovascular:      Rate and Rhythm: Normal rate and regular rhythm. Pulses:           Radial pulses are 2+ on the right side and 2+ on the left side. Heart sounds: Normal heart sounds. No murmur heard. Pulmonary:      Effort: Pulmonary effort is normal. No respiratory distress. Breath sounds: Normal breath sounds. Chest:      Chest wall: No tenderness. Abdominal:      General: Bowel sounds are normal. There is no distension. Palpations: Abdomen is soft. There is no mass. Tenderness: There is no abdominal tenderness. There is no right CVA tenderness, left CVA tenderness, guarding or rebound. Hernia: No hernia is present. Musculoskeletal:         General: Normal range of motion. Cervical back: Normal range of motion and neck supple. Right lower leg: No tenderness. No edema. Left lower leg: No tenderness. No edema. Skin:     General: Skin is warm and dry. Capillary Refill: Capillary refill takes less than 2 seconds. Neurological:      General: No focal deficit present. Mental Status: She is alert and oriented to person, place, and time. Psychiatric:         Mood and Affect: Mood normal.         Behavior: Behavior normal.         Lab and Diagnostic Study Results     Labs -   No results found for this or any previous visit (from the past 12 hour(s)). Radiologic Studies -   @lastxrresult@  CT Results  (Last 48 hours)    None        CXR Results  (Last 48 hours)    None            Medical Decision Making   - I am the first provider for this patient. - I reviewed the vital signs, available nursing notes, past medical history, past surgical history, family history and social history. - Initial assessment performed.  The patients presenting problems have been discussed, and they are in agreement with the care plan formulated and outlined with them. I have encouraged them to ask questions as they arise throughout their visit. Vital Signs-Reviewed the patient's vital signs. Patient Vitals for the past 12 hrs:   Temp Pulse Resp BP SpO2   09/15/21 1458 -- 87 18 131/86 100 %   09/15/21 1453 98.2 °F (36.8 °C) 99 17 129/82 100 %       Records Reviewed: Nursing Notes    The patient presents with fatigue and diarrhe with a differential diagnosis of acute viral illness, viral syndrome, diarrhea, colitis, COVID 19 infection, gastroenteritis, GERD, diverticulitis, dehydration      ED Course:   Patient evaluated thoroughly with physical assessment and interview. Patient was educated thoroughly on plan of care and COVID testing. She is agreeable with plan of care and with diarrhea control medication. She is able to tolerate her seizure medications and has not missed any doses. Educated on hydration and COVID precautions to follow while awaiting test results. Work excuse provided while awaiting test results. Provider Notes (Medical Decision Making):   MDM  Number of Diagnoses or Management Options  Contact with and (suspected) exposure to covid-19: new, needed workup  Diarrhea, unspecified type: new, no workup  Diagnosis management comments: Patient seen, interviewed, and thoroughly evaluated. No evidence of dehydration on physical exam and patient is tolerating po fluids and food. Will check COVID PCR testing given her known exposure and concern. Educated for COVID precautions and quarantine while awaiting results.         Amount and/or Complexity of Data Reviewed  Discuss the patient with other providers: yes    Risk of Complications, Morbidity, and/or Mortality  Presenting problems: minimal  Diagnostic procedures: minimal  Management options: minimal    Patient Progress  Patient progress: stable         Disposition   Disposition: Condition stable  DC- Adult Discharges: All of the diagnostic tests were reviewed and questions answered. Diagnosis, care plan and treatment options were discussed. The patient understands the instructions and will follow up as directed. The patients results have been reviewed with them. They have been counseled regarding their diagnosis. The patient verbally convey understanding and agreement of the signs, symptoms, diagnosis, treatment and prognosis and additionally agrees to follow up as recommended with their PCP in 24 - 48 hours. They also agree with the care-plan and convey that all of their questions have been answered. I have also put together some discharge instructions for them that include: 1) educational information regarding their diagnosis, 2) how to care for their diagnosis at home, as well a 3) list of reasons why they would want to return to the ED prior to their follow-up appointment, should their condition change. DISCHARGE PLAN:  1. Current Discharge Medication List      CONTINUE these medications which have NOT CHANGED    Details   atomoxetine (Strattera) 40 mg capsule Take 40 mg by mouth daily. clonazePAM (KlonoPIN) 1 mg tablet TAKE 1 TABLETS BY MOUTH AS NEEDED FOR SLEEP. Qty: 10 Tablet, Refills: 0    Comments: Please disregard last rx for 30 tabs. Associated Diagnoses: Primary insomnia; Anxiety attack      carBAMazepine (TEGretol) 200 mg tablet TAKE 1 TABLET BY MOUTH TWICE A DAY           2. Follow-up Information     Follow up With Specialties Details Why Contact Suyapa Gr NP Nurse Practitioner In 3 days  [address]  [facility]  [address]  [phone]      Follow up with primary care, urgent care, or this Emergency department   As needed, If symptoms worsen         3. Return to ED if worse   4.    Current Discharge Medication List      START taking these medications    Details   loperamide (IMODIUM) 2 mg capsule Take 1 Capsule by mouth four (4) times daily as needed for Diarrhea for up to 5 days. Indications: diarrhea  Qty: 20 Capsule, Refills: 0  Start date: 9/15/2021, End date: 9/20/2021               Diagnosis     Clinical Impression:   1. Contact with and (suspected) exposure to covid-19    2. Diarrhea, unspecified type        Attestations:    Genesis Hinkle NP    Please note that this dictation was completed with [employer], the computer voice recognition software. Quite often unanticipated grammatical, syntax, homophones, and other interpretive errors are inadvertently transcribed by the computer software. Please disregard these errors. Please excuse any errors that have escaped final proofreading. Thank you.

## 2021-09-15 NOTE — LETTER
[facility]  [address] [facility] [place] [address]  [phone]    Work/School Note    Date: 9/15/2021     To Whom It May concern:    Aakash Aleman was evaulated by the following provider(s):  Nurse Practitioner: Wei Aguayo NP.   COVID19 virus is suspected. Per the CDC guidelines we recommend home isolation until the following conditions are all met:    1. At least 10 days have passed since symptoms first appeared and  2. At least 24 hours have passed since last fever without the use of fever-reducing medications and  3.  Symptoms (e.g., cough, shortness of breath) have improved    SincerelyElizabeth

## 2021-09-16 LAB — SARS-COV-2, COV2: NORMAL

## 2021-09-17 LAB
SARS-COV-2, XPLCVT: NOT DETECTED
SOURCE, COVRS: NORMAL

## 2022-03-18 PROBLEM — Z91.51 HISTORY OF SUICIDE ATTEMPT: Status: ACTIVE | Noted: 2020-04-09

## 2022-03-19 PROBLEM — R45.851 SUICIDAL IDEATION: Status: ACTIVE | Noted: 2021-08-03

## 2022-04-16 ENCOUNTER — APPOINTMENT (OUTPATIENT)
Dept: GENERAL RADIOLOGY | Age: 27
End: 2022-04-16
Attending: PHYSICIAN ASSISTANT
Payer: COMMERCIAL

## 2022-04-16 ENCOUNTER — HOSPITAL ENCOUNTER (EMERGENCY)
Age: 27
Discharge: HOME | End: 2022-04-16
Payer: COMMERCIAL

## 2022-04-16 VITALS
TEMPERATURE: 97.7 F | RESPIRATION RATE: 18 BRPM | OXYGEN SATURATION: 100 % | SYSTOLIC BLOOD PRESSURE: 110 MMHG | BODY MASS INDEX: 19.51 KG/M2 | DIASTOLIC BLOOD PRESSURE: 73 MMHG | HEIGHT: 62 IN | WEIGHT: 106 LBS | HEART RATE: 99 BPM
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
## 2022-04-16 DIAGNOSIS — M79.645 FINGER PAIN, LEFT: ICD-10-CM

## 2022-04-16 DIAGNOSIS — S62.665B OPEN NONDISPLACED FRACTURE OF DISTAL PHALANX OF LEFT RING FINGER, INITIAL ENCOUNTER: ICD-10-CM

## 2022-04-16 PROCEDURE — 74011250636 HC RX REV CODE- 250/636: Performed by: PHYSICIAN ASSISTANT

## 2022-04-16 PROCEDURE — 74011000250 HC RX REV CODE- 250: Performed by: PHYSICIAN ASSISTANT

## 2022-04-16 PROCEDURE — 75810000283 HC INJECTION NERVE BLOCK

## 2022-04-16 PROCEDURE — 96374 THER/PROPH/DIAG INJ IV PUSH: CPT

## 2022-04-16 PROCEDURE — 99284 EMERGENCY DEPT VISIT MOD MDM: CPT

## 2022-04-16 PROCEDURE — 74011250637 HC RX REV CODE- 250/637: Performed by: PHYSICIAN ASSISTANT

## 2022-04-16 PROCEDURE — 73130 X-RAY EXAM OF HAND: CPT

## 2022-04-16 RX ORDER — IBUPROFEN 600 MG/1
600 TABLET ORAL
Qty: 20 TABLET | Refills: 0 | Status: SHIPPED | OUTPATIENT
Start: 2022-04-16

## 2022-04-16 RX ORDER — LIDOCAINE HYDROCHLORIDE 10 MG/ML
10 INJECTION INFILTRATION; PERINEURAL ONCE
Status: COMPLETED | OUTPATIENT
Start: 2022-04-16 | End: 2022-04-16

## 2022-04-16 RX ORDER — OXYCODONE AND ACETAMINOPHEN 5; 325 MG/1; MG/1
1 TABLET ORAL ONCE
Status: COMPLETED | OUTPATIENT
Start: 2022-04-16 | End: 2022-04-16

## 2022-04-16 RX ORDER — AMOXICILLIN AND CLAVULANATE POTASSIUM 875; 125 MG/1; MG/1
1 TABLET, FILM COATED ORAL 2 TIMES DAILY
Qty: 20 TABLET | Refills: 0 | Status: SHIPPED | OUTPATIENT
Start: 2022-04-16 | End: 2022-04-26

## 2022-04-16 RX ORDER — HYDROCODONE BITARTRATE AND ACETAMINOPHEN 5; 325 MG/1; MG/1
1 TABLET ORAL
Qty: 12 TABLET | Refills: 0 | Status: SHIPPED | OUTPATIENT
Start: 2022-04-16 | End: 2022-04-19

## 2022-04-16 RX ADMIN — CEFAZOLIN SODIUM 2 G: 1 INJECTION, POWDER, FOR SOLUTION INTRAMUSCULAR; INTRAVENOUS at 20:48

## 2022-04-16 RX ADMIN — LIDOCAINE HYDROCHLORIDE 10 ML: 10 INJECTION, SOLUTION INFILTRATION; PERINEURAL at 19:51

## 2022-04-16 RX ADMIN — OXYCODONE AND ACETAMINOPHEN 1 TABLET: 325; 5 TABLET ORAL at 19:18

## 2022-04-16 NOTE — Clinical Note
[facility] DEPT  [address] [place] [address]  [phone]    Work/School Note    Date: 4/16/2022    To Whom It May concern:    Fabian Leary was seen and treated today in the emergency room by the following provider(s):  Physician Assistant: Shakir Morrell PA-C. Fabian Leary is excused from work/school on 04/16/22 and 04/17/22. She is medically clear to return to work/school on 4/18/2022.        Sincerely,          Mercedes Islas PA-C

## 2022-04-16 NOTE — ED PROVIDER NOTES
EMERGENCY DEPARTMENT HISTORY AND PHYSICAL EXAM      Date: 4/16/2022  Patient Name: Hank Wilson    History of Presenting Illness     Chief Complaint   Patient presents with    Dog Bite       History Provided By: Patient    HPI: Hank Wilson, 32 y.o. female with a past medical history significant for asthma, bipolar disorder, and anxiety who presents to the ED with cc of dog bite. Patient reports that her dog bit her left ring finger 2 hours prior to arrival.  She states that the dog is up-to-date on rabies immunizations and notes that her tetanus is also up-to-date. Patient denies any additional injury or trauma or treating her pain with anything since onset. She notes that she is left-hand dominant. She states that she is otherwise well has no further concerns. Patient specifically denies any fever, chills, cough, congestion, chest pain, shortness of breath, abdominal pain, nausea, vomiting, changes in bowel or bladder habits, headache, lightheadedness, dizziness, diaphoresis, or rash. There are no other complaints, changes, or physical findings at this time. PCP: Khadar Aleman NP    No current facility-administered medications on file prior to encounter. Current Outpatient Medications on File Prior to Encounter   Medication Sig Dispense Refill    atomoxetine (Strattera) 40 mg capsule Take 40 mg by mouth daily.  [DISCONTINUED] clonazePAM (KlonoPIN) 1 mg tablet TAKE 1 TABLETS BY MOUTH AS NEEDED FOR SLEEP. 10 Tablet 0    carBAMazepine (TEGretol) 200 mg tablet TAKE 1 TABLET BY MOUTH TWICE A DAY         Past History     Past Medical History:  Past Medical History:   Diagnosis Date    Anxiety     Asthma     Bipolar 1 disorder ([address]     Pneumonia     Seizures (HCC)     Strep throat        Past Surgical History:  Past Surgical History:   Procedure Laterality Date    HX HEENT      strabismus correction        Family History:  History reviewed. No pertinent family history.     Social History:  Social History     Tobacco Use    Smoking status: Former Smoker     Packs/day: 0.50    Smokeless tobacco: Current User   Substance Use Topics    Alcohol use: Yes     Alcohol/week: 15.0 standard drinks     Types: 15 Cans of beer per week     Comment: daily beer use/ 15/week    Drug use: Yes     Frequency: 1.0 times per week     Types: Cocaine, Methamphetamines, Other     Comment: crystal meth       Allergies: Allergies   Allergen Reactions    Bee Pollen Swelling     Bee sting    Lamictal [Lamotrigine] Rash    Peanut Hives    Peanuts [Peanut Oil] Hives         Review of Systems     Review of Systems   Constitutional: Negative. Negative for chills, diaphoresis and fever. HENT: Negative. Negative for congestion, rhinorrhea and sore throat. Eyes: Negative. Respiratory: Negative. Negative for cough, chest tightness, shortness of breath and wheezing. Cardiovascular: Negative. Negative for chest pain and palpitations. Gastrointestinal: Negative. Negative for abdominal pain, diarrhea, nausea and vomiting. Genitourinary: Negative. Negative for difficulty urinating, dysuria, flank pain, frequency and hematuria. Musculoskeletal: Positive for myalgias. Negative for back pain and gait problem. Skin: Positive for wound. Negative for rash. Neurological: Negative. Negative for dizziness, syncope, weakness, light-headedness, numbness and headaches. Psychiatric/Behavioral: The patient is nervous/anxious. All other systems reviewed and are negative. Physical Exam     Physical Exam  Vitals and nursing note reviewed. Constitutional:       General: She is not in acute distress. Appearance: Normal appearance. She is not ill-appearing or toxic-appearing. HENT:      Head: Normocephalic and atraumatic. Mouth/Throat:      Mouth: Mucous membranes are moist.      Pharynx: Oropharynx is clear. Eyes:      Extraocular Movements: Extraocular movements intact.       Pupils: Pupils are equal, round, and reactive to light. Cardiovascular:      Rate and Rhythm: Normal rate and regular rhythm. Pulses: Normal pulses. Heart sounds: Normal heart sounds. No murmur heard. No friction rub. No gallop. Pulmonary:      Effort: Pulmonary effort is normal.      Breath sounds: Normal breath sounds. No wheezing, rhonchi or rales. Musculoskeletal:      Left hand: Tenderness present. Cervical back: Neck supple. No tenderness. Right lower leg: No edema. Left lower leg: No edema. Comments: Nail avulsion left ring finger with un-repairable soft tissue wound. Laceration is distal to joint line and flexion and extension at DIP joint intact. NVI distally. Skin:     General: Skin is warm and dry. Capillary Refill: Capillary refill takes less than 2 seconds. Findings: Wound (distal left middle finger) present. No rash. Neurological:      General: No focal deficit present. Mental Status: She is alert and oriented to person, place, and time. Sensory: Sensation is intact. Motor: Motor function is intact. Gait: Gait is intact. Psychiatric:         Mood and Affect: Mood normal.         Behavior: Behavior normal.         Lab and Diagnostic Study Results     Labs -   No results found for this or any previous visit (from the past 12 hour(s)). Radiologic Studies -   @lastxrresult@  CT Results  (Last 48 hours)    None        CXR Results  (Last 48 hours)    None            Medical Decision Making   - I am the first provider for this patient. - I reviewed the vital signs, available nursing notes, past medical history, past surgical history, family history and social history. - Initial assessment performed. The patients presenting problems have been discussed, and they are in agreement with the care plan formulated and outlined with them. I have encouraged them to ask questions as they arise throughout their visit.     Vital Signs-Reviewed the patient's vital signs. Patient Vitals for the past 12 hrs:   Temp Pulse Resp BP SpO2   04/16/22 2122 -- 99 18 110/73 100 %   04/16/22 1823 97.7 °F (36.5 °C) (!) 105 17 105/73 100 %       Records Reviewed: Nursing Notes and Old Medical Records         Provider Notes (Medical Decision Making):     MDM  Number of Diagnoses or Management Options  Dog bite, initial encounter  Finger pain, left  Open nondisplaced fracture of distal phalanx of left ring finger, initial encounter  Diagnosis management comments: The patient presents with finger pain after dog bite with a differential diagnosis of fracture, dislocation, laceration, abrasion, contusion, sprain, strain    Will assess with plain films, control pain, clean and repair wound, reassess, discharge with close orthopedic follow-up       Amount and/or Complexity of Data Reviewed  Tests in the radiology section of CPT®: ordered and reviewed  Tests in the medicine section of CPT®: ordered and reviewed  Review and summarize past medical records: yes       ED Course:   9:21 PM  Patient has been reassessed and updated on all results and findings. She reports that her pain is well controlled at this time. She has been advised of need for close follow-up with orthopedic hand specialist provided referral at time of discharge. Patient also treated with 2 g of Ancef during ED stay. Patient's wound was copiously irrigated during ED encounter and soaked in Betadine for approximately 30 minutes. She additionally had wound dressed per nursing a medical finger splint applied. She has no additional complaints or concerns questions answered. Patient conveys good agreement understanding with care plan as outlined agrees to schedule follow-up with orthopedics within the next week, or return to ED sooner if worse. Anticipate discharge home shortly.        Procedures   Medical Decision Makingedical Decision Making  Performed by: Rashida Duckworth PA-C  PROCEDURES:  Procedures Procedure Note - Digital Block:   8:05 PM  Performed by: Shabnam Islas PA-C  Lidocaine 1% without epinephrine used to perform digital block of Left ring finger(s). The procedure took 1-15 minutes, and pt tolerated well. Disposition   Disposition: DC- Adult Discharges: All of the diagnostic tests were reviewed and questions answered. Diagnosis, care plan and treatment options were discussed. The patient understands the instructions and will follow up as directed. The patients results have been reviewed with them. They have been counseled regarding their diagnosis. The patient verbally convey understanding and agreement of the signs, symptoms, diagnosis, treatment and prognosis and additionally agrees to follow up as recommended with their PCP in 24 - 48 hours. They also agree with the care-plan and convey that all of their questions have been answered. I have also put together some discharge instructions for them that include: 1) educational information regarding their diagnosis, 2) how to care for their diagnosis at home, as well a 3) list of reasons why they would want to return to the ED prior to their follow-up appointment, should their condition change. DISCHARGE PLAN:  1. Current Discharge Medication List      CONTINUE these medications which have NOT CHANGED    Details   atomoxetine (Strattera) 40 mg capsule Take 40 mg by mouth daily. clonazePAM (KlonoPIN) 1 mg tablet TAKE 1 TABLETS BY MOUTH AS NEEDED FOR SLEEP. Qty: 10 Tablet, Refills: 0    Comments: Please disregard last rx for 30 tabs. Associated Diagnoses: Primary insomnia; Anxiety attack      carBAMazepine (TEGretol) 200 mg tablet TAKE 1 TABLET BY MOUTH TWICE A DAY           2.    Follow-up Information     Follow up With Specialties Details Why Julian Mathew MD Orthopedic Surgery Schedule an appointment as soon as possible for a visit in 1 week  [phone] [address]  [phone]      Rosina Llanes, [facility] Nurse Practitioner  As needed [address]  [address] [place] [address]  [phone]          3. Return to ED if worse   4. Current Discharge Medication List      START taking these medications    Details   ibuprofen (MOTRIN) 600 mg tablet Take 1 Tablet by mouth every six (6) hours as needed for Pain. Qty: 20 Tablet, Refills: 0  Start date: 4/16/2022      HYDROcodone-acetaminophen (Norco) 5-325 mg per tablet Take 1 Tablet by mouth every six (6) hours as needed for Pain for up to 3 days. Max Daily Amount: 4 Tablets. Qty: 12 Tablet, Refills: 0  Start date: 4/16/2022, End date: 4/19/2022    Associated Diagnoses: Dog bite, initial encounter; Open nondisplaced fracture of distal phalanx of left ring finger, initial encounter; Finger pain, left      amoxicillin-clavulanate (Augmentin) 875-125 mg per tablet Take 1 Tablet by mouth two (2) times a day for 10 days. Qty: 20 Tablet, Refills: 0  Start date: 4/16/2022, End date: 4/26/2022               Diagnosis     Clinical Impression:   1. Dog bite, initial encounter    2. Open nondisplaced fracture of distal phalanx of left ring finger, initial encounter    3. Finger pain, left        Attestations:    Romana Cole Pidcoe, PA-C    Please note that this dictation was completed with [employer], the [employer] voice recognition software. Quite often unanticipated grammatical, syntax, homophones, and other interpretive errors are inadvertently transcribed by the computer software. Please disregard these errors. Please excuse any errors that have escaped final proofreading. Thank you.

## 2022-04-17 NOTE — ED NOTES
Dressing applied to left 3rd finger with bacitracin, petroleum jelly dressing, non adherent and gauze. Aluminum finger splint applied.

## 2023-04-05 NOTE — ED NOTES
Forensic nurse called, phone given to pt for her to explain what options are available. Opioid Pregnancy And Lactation Text: These medications can lead to premature delivery and should be avoided during pregnancy. These medications are also present in breast milk in small amounts.

## 2023-05-25 RX ORDER — ATOMOXETINE 40 MG/1
40 CAPSULE ORAL DAILY
COMMUNITY

## 2023-05-25 RX ORDER — IBUPROFEN 600 MG/1
600 TABLET ORAL EVERY 6 HOURS PRN
COMMUNITY
Start: 2022-04-16

## 2023-05-25 RX ORDER — CARBAMAZEPINE 200 MG/1
1 TABLET ORAL 2 TIMES DAILY
COMMUNITY
Start: 2020-06-30

## 2023-06-29 NOTE — FORENSIC NURSE
FNE consulted regarding patient. Patient to be admitted to behavioral health.   Forensics will see patient after 3pm on 8/3/2021 at [facility] on the behavioral health unit for a forensic exam. 23:00